# Patient Record
Sex: MALE | Race: WHITE | Employment: OTHER | URBAN - METROPOLITAN AREA
[De-identification: names, ages, dates, MRNs, and addresses within clinical notes are randomized per-mention and may not be internally consistent; named-entity substitution may affect disease eponyms.]

---

## 2017-10-24 PROBLEM — N52.9 ED (ERECTILE DYSFUNCTION) OF ORGANIC ORIGIN: Status: ACTIVE | Noted: 2017-10-24

## 2017-10-24 PROBLEM — Z80.42 FH: PROSTATE CANCER: Status: ACTIVE | Noted: 2017-10-24

## 2018-01-03 ENCOUNTER — HOSPITAL ENCOUNTER (OUTPATIENT)
Dept: PHYSICAL THERAPY | Age: 58
Discharge: HOME | End: 2018-01-03
Payer: COMMERCIAL

## 2018-01-03 PROCEDURE — 97110 THERAPEUTIC EXERCISES: CPT

## 2018-01-03 PROCEDURE — 97161 PT EVAL LOW COMPLEX 20 MIN: CPT

## 2018-01-03 PROCEDURE — 97112 NEUROMUSCULAR REEDUCATION: CPT

## 2018-01-03 PROCEDURE — 97140 MANUAL THERAPY 1/> REGIONS: CPT

## 2018-01-03 NOTE — PROGRESS NOTES
PT DAILY TREATMENT NOTE/LUMBAR EVAL 3-16    Patient Name: Johnny Barber MD  Date:1/3/2018  : 1960  [x]  Patient  Verified  Payor: Reyes Singer / [facility] / Product Type: Commerical /    In time:5:13  Out time:6:30  Total Treatment Time (min): 73  Visit #: 1 of 10    Treatment Area: Low back pain [M54.5]  SUBJECTIVE  Pain Level (0-10 scale): 3  []constant []intermittent []improving []worsening []no change since onset    Any medication changes, allergies to medications, adverse drug reactions, diagnosis change, or new procedure performed?: [x] No    [] Yes (see summary sheet for update)  Subjective functional status/changes:     Subjective:  Pt c/o long term back pain since the [employer] days. Numbness in (R) foot about 3 months ago. .  Recdived MRI showing multilevel DDD with bulge at L5 - S1. Considered inversion table but has not tried it yet. 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 Standing a lot with work, [employer] workout 1x/week    Limitations to Activity/PLOF:       Current Health Status:  Good    Barriers: [x]pain []financial []time []transportation []other    Goal: Have the numbness in the foot go away.   Not have the back pain , Increase function with less discomfort    FOTO: 65 / 71 10v    Motivation: High    Substance use: []Alcohol []Tobacco []other:     FABQ Score: []low []elevate    Cognition: A & O x 3    Other:    OBJECTIVE/EXAMINATION  - Gait - Decr (B) Hip Flx, Decr (L) pelvic sway  - Post Rot (R) Innominate  - Elev (R) ASIS  - Decr mobilty (R) Innominate in stork stance  - Fingertip-Floor 18cm  - HS 90/90 (R) 150 (L) 150  - MMT (B) Hip Flx 4-4+  - Decr (R) Multifidus initiation  - Elev (L) sacral base           35 min [x]Eval                  []Re-Eval       10 min Therapeutic Exercise:  [x] See flow sheet : Develop and instruct HEP   Rationale: increase ROM, increase strength and improve coordination to improve the patients ability to perform increased ADL    18 min Neuromuscular Re-education:  [x]  See flow sheet : Positional Distraction Progressive elevation into Lx Ext and progressive lowering   Rationale: increase ROM, increase strength and improve coordination  to improve the patients ability to perform increased ADL    10 min Manual Therapy:  Inf glide (L) sacral base, (B) Lx rot mobs, (B) Innominate p/a mobs, (B) LE LAD, TPR (R) Quadratus Lumbrum   Rationale: decrease pain, increase ROM, increase tissue extensibility and decrease trigger points to tolerate increased activity levels       With   [x] TE   [] TA   [] neuro   [] other: Patient Education: [x] Review HEP    [] Progressed/Changed HEP based on:   [] positioning   [] body mechanics   [] transfers   [] heat/ice application    [] other:      Other Objective/Functional Measures:   - Good pelvic alignment after treatment and improve gait   - Improved (B) pelvic sway after treatment-     Pain Level (0-10 scale) post treatment: 3    ASSESSMENT/Changes in Function:      Patient will continue to benefit from skilled PT services to modify and progress therapeutic interventions, address functional mobility deficits, address ROM deficits, address strength deficits, analyze and address soft tissue restrictions and analyze and cue movement patterns to attain remaining goals. [x]  See Plan of Care  []  See progress note/recertification  []  See Discharge Summary         Progress towards goals / Updated goals:  Short Term Goals:  To be accomplished in 5 treatments:  1.  Pt will be compliant and independent with HEP in order to facilitate PT sessions and aid with self management                        Eval Status:  Initiated                        Current Status:  2.  Pt to tolerate 30 min or more of TE and/or Interventions w/o increased s/s                        Eval Status:  Initiated                        Current Status:     Long Term Goals:  To be accomplished in 10 treatments:  Progress towards goals / Updated goals:  1.  Pt will report 50% improvement or better with involvement to show a significant increase in function                        Eval Status:  Initiated                        Current Status:  2.  Pt will Demonstrate good pelvic alignment and mobility to aid with decreased pain of the Lx spine                        Eval Status:  Initiated                        Current Status:  3.  Pt will have improved (B) HS length to 160* or better to allow good pelvic mobility and decreased pain to 1/10 or better to tolerate leaning over a sink to brush teeth w/o added pain                          Eval Status:  Initiated                        Current Status:  4.  Pt will have good (R) multifidus initiation to aid in Lx stability with prolonged standing and leaning forward to brush teeth and perform usual work                          Eval Status:  Initiated                        Current Status:  5.  Pt will improve FOTO score to 71 in 10 visits to show significant improvement in function for progress to little to no numbness/Neural tension of the (R) foot                        Eval Status: 65                        Current Status:    PLAN  [x]  Upgrade activities as tolerated     [x]  Continue plan of care  []  Update interventions per flow sheet       []  Discharge due to:_  []  Other:_      Andrew So, PT 1/3/2018  5:17 PM

## 2018-01-04 NOTE — PROGRESS NOTES
[facility] [address], [address] [place] [address], [address] [address]  [phone] fax      Plan of Care/ Statement of Necessity for Physical Therapy Services    Patient name: Ascencion Ansari MD Start of Care: 1/3/2018   Referral source: Lacy Gonzalez MD : 1960    Medical Diagnosis: Low back pain [M54.5]   Onset Date:3 Months ago    Treatment Diagnosis: Pelvic obliquity, Neural tension (R) LE   Prior Hospitalization: see medical history Provider#: [id number]   Medications: Verified on Patient summary List    Comorbidities: None noted   Prior Level of Function: (I) with normal activity with some back pain     The Plan of Care and following information is based on the information from the initial evaluation. Assessment/ key information: Patient is a 59 y.o. male referred to PT with the above Dx. Patient seen today for c/o  long term back pain since the [employer] days with recent onset of numbness in (R) foot about 3 months ago. Received an MRI showing multilevel DDD with bulge at L5 - S1. Considered inversion table but has not tried it yet. Pain with leaning forward a little bit as when brushing his teeth. Patient presents to PT with an impaired gait, decreased strength, decreased flexibility, and decreased mobility. He has a pelvic obliquity, limited (B) HS length, Decreased initiation (R) multifidus, and minor neural tension noted in the (R) LE. Patient s/s appear to be consistent w/ diagnosis. Patient demonstrates the potential to make functional gains within a reasonable time frame. Patient will benefit from skilled PT to address impairments and improve functional mobility, strength, gait and balance for an improved quality of life.   Fall Risk Assessment: Patient demonstrates no Fall Risk   Evaluation Complexity History MEDIUM  Complexity : 1-2 comorbidities / personal factors will impact the outcome/ POC ; Examination MEDIUM Complexity : 3 Standardized tests and measures addressing body structure, function, activity limitation and / or participation in recreation  ;Presentation LOW Complexity : Stable, uncomplicated  ;Clinical Decision Making MEDIUM Complexity : FOTO score of 26-74  Overall Complexity Rating: LOW   Problem List: pain affecting function, decrease ROM, decrease strength, impaired gait/ balance, decrease ADL/ functional abilitiies, decrease activity tolerance, decrease flexibility/ joint mobility, decrease transfer abilities and other FOTO = 65   Treatment Plan may include any combination of the following: Therapeutic exercise, Therapeutic activities, Neuromuscular re-education, Physical agent/modality, Gait/balance training, Manual therapy, Patient education, Self Care training and Functional mobility training  Patient / Family readiness to learn indicated by: asking questions, trying to perform skills and interest  Persons(s) to be included in education: patient (P)  Barriers to Learning/Limitations: None  Patient Goal (s): Have the numbness in the foot go away. Not have the back pain , Increase function with less discomfort  Patient Self Reported Health Status: good  Rehabilitation Potential: good    Short Term Goals: To be accomplished in 5 treatments:  1. Pt will be compliant and independent with HEP in order to facilitate PT sessions and aid with self management                        Eval Status:  Initiated                        Current Status:  2. Pt to tolerate 30 min or more of TE and/or Interventions w/o increased s/s                        Eval Status:  Initiated                        Current Status:    Long Term Goals: To be accomplished in 10 treatments:  Progress towards goals / Updated goals:  1. Pt will report 50% improvement or better with involvement to show a significant increase in function                        Eval Status:  Initiated                        Current Status:  2.   Pt will Demonstrate good pelvic alignment and mobility to aid with decreased pain of the Lx spine                        Eval Status:  Initiated                        Current Status:  3. Pt will have improved (B) HS length to 160* or better to allow good pelvic mobility and decreased pain to 1/10 or better to tolerate leaning over a sink to brush teeth w/o added pain                          Eval Status:  Initiated                        Current Status:  4. Pt will have good (R) multifidus initiation to aid in Lx stability with prolonged standing and leaning forward to brush teeth and perform usual work                          Eval Status:  Initiated                        Current Status:  5. Pt will improve FOTO score to 71 in 10 visits to show significant improvement in function for progress to little to no numbness/Neural tension of the (R) foot                        Eval Status: 65                        Current Status:  Frequency / Duration: Patient to be seen 2-3 times per week for 10 treatments. Patient/ Caregiver education and instruction: Diagnosis, prognosis, self care, activity modification and exercises   [x]  Plan of care has been reviewed with GAYLA Mercedes, PT 1/3/2018 7:04 PM  ________________________________________________________________________    I certify that the above Therapy Services are being furnished while the patient is under my care. I agree with the treatment plan and certify that this therapy is necessary.     [de-identified] Signature:____________________  Date:____________Time: _________    Please sign and return to [facility] [address] [place] [address], [address] [place] [address], [address] [place] [address] [address] [address]  [phone] fax

## 2018-01-18 ENCOUNTER — HOSPITAL ENCOUNTER (OUTPATIENT)
Dept: PHYSICAL THERAPY | Age: 58
Discharge: HOME | End: 2018-01-18
Payer: COMMERCIAL

## 2018-01-18 PROCEDURE — 97110 THERAPEUTIC EXERCISES: CPT

## 2018-01-18 PROCEDURE — 97140 MANUAL THERAPY 1/> REGIONS: CPT

## 2018-01-18 PROCEDURE — 97112 NEUROMUSCULAR REEDUCATION: CPT

## 2018-01-19 NOTE — PROGRESS NOTES
PT DAILY TREATMENT NOTE 3-16    Patient Name: Melissa Barry MD  Date:2018  : 1960  [x]  Patient  Verified  Payor: Felisha Krueger / [facility] / Product Type: Commerical /    In time:5:37  Out time:6:55  Total Treatment Time (min): 62  Visit #: 2 of 10    Treatment Area: Low back pain [M54.5]    SUBJECTIVE  Pain Level (0-10 scale): 3  Any medication changes, allergies to medications, adverse drug reactions, diagnosis change, or new procedure performed?: [x] No    [] Yes (see summary sheet for update)  Subjective functional status/changes:   [] No changes reported  Tingling in foot is less    OBJECTIVE  25 min Therapeutic Exercise:  [x] See flow sheet :   Rationale: increase ROM, increase strength and improve coordination to improve the patients ability to tolerate increased activity levels  20 min Neuromuscular Re-education:  [x]  See flow sheet : Posn Distraction Lx Ext  In prone progressing to Max at ~ 35* Ext at 3  x5 min intervals w/MHP   Rationale: increase ROM, increase strength and improve coordination  to improve the patients ability to tolerate increased activity levels  12 min Manual Therapy:  STM/DTM (R) Quad Lumborum, Lx Rot mobs, (B) Innominate p/a mobs   Rationale: decrease pain, increase ROM, increase tissue extensibility and decrease trigger points to perform increased ADL       With   [x] TE   [] TA   [] neuro   [] other: Patient Education: [x] Review HEP    [] Progressed/Changed HEP based on:   [] positioning   [] body mechanics   [] transfers   [] heat/ice application    [] other:      Other Objective/Functional Measures:   Good tolerance with first full treatment     Pain Level (0-10 scale) post treatment: 2    ASSESSMENT/Changes in Function:      Patient will continue to benefit from skilled PT services to modify and progress therapeutic interventions, address functional mobility deficits, address ROM deficits, address strength deficits, analyze and address soft tissue restrictions and analyze and cue movement patterns to attain remaining goals. []  See Plan of Care  []  See progress note/recertification  []  See Discharge Summary         Progress towards goals / Updated goals:  Short Term Goals: To be accomplished in 5 treatments:  1.  Pt will be compliant and independent with HEP in order to facilitate PT sessions and aid with self management                        Eval Status:  Initiated                        Current Status:  2.  Pt to tolerate 30 min or more of TE and/or Interventions w/o increased s/s                        Eval Status:  Initiated                        IATAWKF Status: Met 1/18/18     Long Term Goals:  To be accomplished in 10 treatments:  Progress towards goals / Updated goals:  1.  Pt will report 50% improvement or better with involvement to show a significant increase in function                        Eval Status:  Initiated                        Current Status:  2.  Pt will Demonstrate good pelvic alignment and mobility to aid with decreased pain of the Lx spine                        Eval Status:  Initiated                        Current Status:  3.  Pt will have improved (B) HS length to 160* or better to allow good pelvic mobility and decreased pain to 1/10 or better to tolerate leaning over a sink to brush teeth w/o added pain                          Eval Status:  Initiated                        Current Status:  4.  Pt will have good (R) multifidus initiation to aid in Lx stability with prolonged standing and leaning forward to brush teeth and perform usual work                          Eval Status:  Initiated                        Current Status:  5.  Pt will improve FOTO score to 71 in 10 visits to show significant improvement in function for progress to little to no numbness/Neural tension of the (R) foot                        Eval Status: 65                        Current Status:    PLAN  [x]  Upgrade activities as tolerated [x]  Continue plan of care  []  Update interventions per flow sheet       []  Discharge due to:_  []  Other:_      Shaina Nixon, PT 1/18/2018  7:14 PM    Future Appointments  Date Time Provider Cielo aBiley   10/9/2018 8:30 AM [facility] [id number]   10/16/2018 3:30 PM Dusty Landrum MD [id number]

## 2018-02-09 ENCOUNTER — HOSPITAL ENCOUNTER (OUTPATIENT)
Dept: PHYSICAL THERAPY | Age: 58
Discharge: HOME | End: 2018-02-09
Payer: COMMERCIAL

## 2018-02-09 PROCEDURE — 97140 MANUAL THERAPY 1/> REGIONS: CPT

## 2018-02-09 PROCEDURE — 97035 APP MDLTY 1+ULTRASOUND EA 15: CPT

## 2018-02-09 PROCEDURE — 97110 THERAPEUTIC EXERCISES: CPT

## 2018-02-09 NOTE — PROGRESS NOTES
PT DAILY TREATMENT NOTE 3-16    Patient Name: Carie Lopez MD  Date:2018  : 1960  [x]  Patient  Verified  Payor: Katrin Manzo / Plan: [facility] / Product Type: Commerical /    In time:1110  Out time:1205  Total Treatment Time (min): 48  Visit #: 3 of 10    Treatment Area: Low back pain [M54.5]    SUBJECTIVE  Pain Level (0-10 scale): 4  Any medication changes, allergies to medications, adverse drug reactions, diagnosis change, or new procedure performed?: [x] No    [] Yes (see summary sheet for update)  Subjective functional status/changes:   [] No changes reported  Still getting a little numbness in the (R) foot but mainly feel it when walking an my bare bathroom floor    OBJECTIVE  Modality rationale: decrease inflammation, decrease pain and increase tissue extensibility to improve the patients ability to perform increased ADL   Min Type Additional Details    [] Estim:  []Unatt       []IFC  []Premod                        []Other:  []w/ice   []w/heat  Position:  Location:    [] Estim: []Att    []TENS instruct  []NMES                    []Other:  []w/US   []w/ice   []w/heat  Position:  Location:    []  Traction: [] Cervical       []Lumbar                       [] Prone          []Supine                       []Intermittent   []Continuous Lbs:  [] before manual  [] after manual    [x]  Ultrasound: [x]Continuous   [] Pulsed     8' min                      [x]1MHz   []3MHz Location: Prone  W/cm2:1.5    []  Iontophoresis with dexamethasone         Location: [] Take home patch   [] In clinic    []  Ice     []  heat  []  Ice massage  []  Laser   []  Anodyne Position:  Location:    []  Laser with stim  []  Other: Position:  Location:    []  Vasopneumatic Device Pressure:       [] lo [] med [] hi   Temperature: [] lo [] med [] hi   [x] Skin assessment post-treatment:  [x]intact []redness- no adverse reaction    []redness  adverse reaction:       35 min Therapeutic Exercise:  [x] See flow sheet : Rationale: increase ROM, increase strength and improve coordination to improve the patients ability to tolerate increased activity levels  10 min Manual Therapy:  STM/DTM (R) Quad Lumborum, Lx Rot mobs, (B) Innominate p/a mobs   Rationale: decrease pain, increase ROM, increase tissue extensibility and decrease trigger points to perform increased ADL         With   [] TE   [] TA   [] neuro   [] other: Patient Education: [x] Review HEP    [] Progressed/Changed HEP based on:   [] positioning   [] body mechanics   [] transfers   [] heat/ice application    [] other:      Other Objective/Functional Measures:   - Add JAI for 1', repeated Hip Flx, High Knees  - Good tolerance with added exercises  - TTP (R) Quadratus Lumborum  - Pt reports feeling looser after treatment  - Pt amb 190' with good tolerance       Pain Level (0-10 scale) post treatment: 2    ASSESSMENT/Changes in Function:      Patient will continue to benefit from skilled PT services to modify and progress therapeutic interventions, address functional mobility deficits, address ROM deficits, address strength deficits, analyze and address soft tissue restrictions, analyze and cue movement patterns and analyze and modify body mechanics/ergonomics to attain remaining goals. []  See Plan of Care  []  See progress note/recertification  []  See Discharge Summary         Progress towards goals / Updated goals:  Short Term Goals: To be accomplished in 5 treatments:  1.  Pt will be compliant and independent with HEP in order to facilitate PT sessions and aid with self management                        Eval Status:  Initiated                        Current Status:  2.  Pt to tolerate 30 min or more of TE and/or Interventions w/o increased s/s                        Eval Status:  Initiated                        Current Status: Met at 35 min 2/9/18     Long Term Goals:  To be accomplished in 10 treatments:  Progress towards goals / Updated goals:  1.  Pt will report 50% improvement or better with involvement to show a significant increase in function                        Eval Status:  Initiated                        Current Status:  2.  Pt will Demonstrate good pelvic alignment and mobility to aid with decreased pain of the Lx spine                        Eval Status:  Initiated                        Current Status:  3.  Pt will have improved (B) HS length to 160* or better to allow good pelvic mobility and decreased pain to 1/10 or better to tolerate leaning over a sink to brush teeth w/o added pain                          Eval Status: 150 (B)                        Current Status:  4.  Pt will have good (R) multifidus initiation to aid in Lx stability with prolonged standing and leaning forward to brush teeth and perform usual work                          Eval Status:  Initiated                        Current Status: Progressing with VCs 2/9/18  5.  Pt will improve FOTO score to 71 in 10 visits to show significant improvement in function for progress to little to no numbness/Neural tension of the (R) foot                        Eval Status: 65                        Current Status:    PLAN  [x]  Upgrade activities as tolerated     [x]  Continue plan of care  []  Update interventions per flow sheet       []  Discharge due to:_  []  Other:_      Shima Plascencia PT 2/9/2018  11:35 AM    Future Appointments  Date Time Provider Cielo Bailey   2/12/2018 4:30 PM Shima Plascencia PT [facility]   2/16/2018 5:00 PM Shima Plascencia PT [facility]   2/26/2018 4:30 PM Shima Plascencia PT [facility]   2/28/2018 4:30 PM Shima Plascencia PT [facility]   10/9/2018 8:30 AM [facility] [address] [place] [address]   10/16/2018 3:30 PM Garrison Huang MD [id number] [facility]

## 2018-02-12 ENCOUNTER — HOSPITAL ENCOUNTER (OUTPATIENT)
Dept: PHYSICAL THERAPY | Age: 58
Discharge: HOME | End: 2018-02-12
Payer: COMMERCIAL

## 2018-02-12 PROCEDURE — 97140 MANUAL THERAPY 1/> REGIONS: CPT

## 2018-02-12 PROCEDURE — 97110 THERAPEUTIC EXERCISES: CPT

## 2018-02-12 PROCEDURE — 97035 APP MDLTY 1+ULTRASOUND EA 15: CPT

## 2018-02-12 NOTE — PROGRESS NOTES
PT DAILY TREATMENT NOTE 3-16    Patient Name: Berta Bear MD  Date:2018  : 1960  [x]  Patient  Verified  Payor: Dax Coates / Plan: [place] [employer]  CAPITATED PT / Product Type: Commerical /    In time:1110  Out time:1205  Total Treatment Time (min): 48  Visit #: 4 of 10    Treatment Area: Low back pain [M54.5]    SUBJECTIVE  Pain Level (0-10 scale): 3  Any medication changes, allergies to medications, adverse drug reactions, diagnosis change, or new procedure performed?: [x] No    [] Yes (see summary sheet for update)  Subjective functional status/changes:   [] No changes reported  Still getting a little numbness in the (R) foot but mainly feel it when walking an my bare bathroom floor    OBJECTIVE  Modality rationale: decrease inflammation, decrease pain and increase tissue extensibility to improve the patients ability to perform increased ADL   Min Type Additional Details    [] Estim:  []Unatt       []IFC  []Premod                        []Other:  []w/ice   []w/heat  Position:  Location:    [] Estim: []Att    []TENS instruct  []NMES                    []Other:  []w/US   []w/ice   []w/heat  Position:  Location:    []  Traction: [] Cervical       []Lumbar                       [] Prone          []Supine                       []Intermittent   []Continuous Lbs:  [] before manual  [] after manual    [x]  Ultrasound: [x]Continuous   [] Pulsed     8' min                      [x]1MHz   []3MHz Location: (R) Quadratus Lumborum  W/cm2:1.5    []  Iontophoresis with dexamethasone         Location: [] Take home patch   [] In clinic    []  Ice     []  heat  []  Ice massage  []  Laser   []  Anodyne Position:  Location:    []  Laser with stim  []  Other: Position:  Location:    []  Vasopneumatic Device Pressure:       [] lo [] med [] hi   Temperature: [] lo [] med [] hi   [x] Skin assessment post-treatment:  [x]intact []redness- no adverse reaction    []redness  adverse reaction:       34 min Therapeutic Exercise:  [x] See flow sheet :   Rationale: increase ROM, increase strength and improve coordination to improve the patients ability to tolerate increased activity levels  8 min Manual Therapy:  STM/DTM (R) Quad Lumborum, Lx Rot mobs, (B) Innominate p/a mobs   Rationale: decrease pain, increase ROM, increase tissue extensibility and decrease trigger points to perform increased ADL         With   [x] TE   [] TA   [] neuro   [] other: Patient Education: [x] Review HEP    [] Progressed/Changed HEP based on:   [] positioning   [] body mechanics   [] transfers   [] heat/ice application    [] other:      Other Objective/Functional Measures:   - Good progress with innominate mobility  - Minor (R) Innominate post rotation       Pain Level (0-10 scale) post treatment: 2    ASSESSMENT/Changes in Function:      Patient will continue to benefit from skilled PT services to modify and progress therapeutic interventions, address functional mobility deficits, address ROM deficits, address strength deficits, analyze and address soft tissue restrictions, analyze and cue movement patterns and analyze and modify body mechanics/ergonomics to attain remaining goals. []  See Plan of Care  []  See progress note/recertification  []  See Discharge Summary         Progress towards goals / Updated goals:  Short Term Goals: To be accomplished in 5 treatments:  1.  Pt will be compliant and independent with HEP in order to facilitate PT sessions and aid with self management                        Eval Status:  Initiated                        Current Status:  2.  Pt to tolerate 30 min or more of TE and/or Interventions w/o increased s/s                        Eval Status:  Initiated                        Current Status: Met at 35 min 2/9/18     Long Term Goals:  To be accomplished in 10 treatments:  Progress towards goals / Updated goals:  1.  Pt will report 50% improvement or better with involvement to show a significant increase in function                        Eval Status:  Initiated                        Current Status:  2.  Pt will Demonstrate good pelvic alignment and mobility to aid with decreased pain of the Lx spine                        Eval Status:  Initiated                        QXARZDT Status:  3.  Pt will have improved (B) HS length to 160* or better to allow good pelvic mobility and decreased pain to 1/10 or better to tolerate leaning over a sink to brush teeth w/o added pain                          Eval Status: 150 (B)                        Current Status:  4.  Pt will have good (R) multifidus initiation to aid in Lx stability with prolonged standing and leaning forward to brush teeth and perform usual work                          Eval Status:  Initiated                        Current Status: Improved initiation 2/12/18  5.  Pt will improve FOTO score to 71 in 10 visits to show significant improvement in function for progress to little to no numbness/Neural tension of the (R) foot                        Eval Status: 65                        Current Status:    PLAN  [x]  Upgrade activities as tolerated     [x]  Continue plan of care  []  Update interventions per flow sheet       []  Discharge due to:_  []  Other:_      Shaina Nixon PT 2/12/2018  16:15 PM    Future Appointments  Date Time Provider Cielo Bailey   2/16/2018 5:00 PM Shaina Nixon PT [facility]   2/26/2018 4:30 PM Shaina Nixon PT [facility]   2/28/2018 4:30 PM Shaina Nixon PT [facility]   3/26/2018 4:30 PM Shaina Nixon PT [facility]   3/28/2018 4:30 PM Shaina Nixon PT [facility]   10/9/2018 8:30 AM [facility] NURSE [place] [id number]   10/16/2018 3:30 PM Dusty Landrum MD [address]

## 2018-02-16 ENCOUNTER — HOSPITAL ENCOUNTER (OUTPATIENT)
Dept: PHYSICAL THERAPY | Age: 58
Discharge: HOME | End: 2018-02-16
Payer: COMMERCIAL

## 2018-02-16 PROCEDURE — 97140 MANUAL THERAPY 1/> REGIONS: CPT

## 2018-02-16 PROCEDURE — 97110 THERAPEUTIC EXERCISES: CPT

## 2018-02-16 NOTE — PROGRESS NOTES
PT DAILY TREATMENT NOTE 3-16    Patient Name: Carie Lopez MD  Date:2018  : 1960  [x]  Patient  Verified  Payor: Katrin Manzo / Plan: [place] [employer]  CAPITATRAN PT / Product Type: Commerical /    In time:5:14  Out time:6:13  Total Treatment Time (min): 59  Visit #: 5 of 10    Treatment Area: Low back pain [M54.5]    SUBJECTIVE  Pain Level (0-10 scale): 3  Any medication changes, allergies to medications, adverse drug reactions, diagnosis change, or new procedure performed?: [x] No    [] Yes (see summary sheet for update)  Subjective functional status/changes:   [] No changes reported  Still getting a little numbness     OBJECTIVE      43 min Therapeutic Exercise:  [x] See flow sheet :   Rationale: increase ROM, increase strength and improve coordination to improve the patients ability to tolerate increased activity levels  12 min Manual Therapy:  STM/DTM (R) Quad Lumborum,   Rationale: decrease pain, increase ROM, increase tissue extensibility and decrease trigger points to perform increased ADL  4 min Neuromuscular Re-education:  [x]  See flow sheet : KT I-Strip for spacing at the (R) Quadratus lumborum full strength    Rationale: increase ROM, increase strength and improve coordination  to improve the patients ability to perform increased ADL          With   [x] TE   [] TA   [] neuro   [] other: Patient Education: [x] Review HEP    [] Progressed/Changed HEP based on:   [] positioning   [] body mechanics   [] transfers   [] heat/ice application    [] other:      Other Objective/Functional Measures:   - Good progress with innominate mobility  - Add Quadratus Lumborum str  - Increase STM/DTM (R) Quadratus Lumborum  - - Treat with KT Tape       Pain Level (0-10 scale) post treatment: 0    ASSESSMENT/Changes in Function:      Patient will continue to benefit from skilled PT services to modify and progress therapeutic interventions, address functional mobility deficits, address ROM deficits, address strength deficits, analyze and address soft tissue restrictions, analyze and cue movement patterns and analyze and modify body mechanics/ergonomics to attain remaining goals. []  See Plan of Care  []  See progress note/recertification  []  See Discharge Summary         Progress towards goals / Updated goals:  Short Term Goals: To be accomplished in 5 treatments:  1.  Pt will be compliant and independent with HEP in order to facilitate PT sessions and aid with self management                        Eval Status:  Initiated                        Current Status:  2.  Pt to tolerate 30 min or more of TE and/or Interventions w/o increased s/s                        Eval Status:  Initiated                        Current Status: Met at 35 min 2/9/18     Long Term Goals:  To be accomplished in 10 treatments:  Progress towards goals / Updated goals:  1.  Pt will report 50% improvement or better with involvement to show a significant increase in function                        Eval Status:  Initiated                        Current Status:  2.  Pt will Demonstrate good pelvic alignment and mobility to aid with decreased pain of the Lx spine                        Eval Status:  Initiated                        Current Status: Progressing with good (B) Innominate mobility today 2/16/18  3.  Pt will have improved (B) HS length to 160* or better to allow good pelvic mobility and decreased pain to 1/10 or better to tolerate leaning over a sink to brush teeth w/o added pain                          Eval Status: 150 (B)                        Current Status:  4.  Pt will have good (R) multifidus initiation to aid in Lx stability with prolonged standing and leaning forward to brush teeth and perform usual work                          Eval Status:  Initiated                        Current Status: Pt showing good initiation (B) Multifidus 2/16/18  5.  Pt will improve FOTO score to 71 in 10 visits to show significant improvement in function for progress to little to no numbness/Neural tension of the (R) foot                        Eval Status: 65                        Current Status:    PLAN  [x]  Upgrade activities as tolerated     [x]  Continue plan of care  []  Update interventions per flow sheet       []  Discharge due to:_  []  Other:_      Roshan Shafer, PT 2/16/2018  5:40 PM    Future Appointments  Date Time Provider Cielo Bailey   2/26/2018 4:30 PM Roshan Shafer, PT [facility]   2/28/2018 4:30 PM Roshan Shafer, PT [facility]   3/26/2018 4:30 PM Roshan Shafer, PT [facility]   3/28/2018 4:30 PM Roshan Shafer, PT [facility]   10/9/2018 8:30 AM [address]   10/16/2018 3:30 PM Elis Amanda MD [id number]

## 2018-02-26 ENCOUNTER — HOSPITAL ENCOUNTER (OUTPATIENT)
Dept: PHYSICAL THERAPY | Age: 58
Discharge: HOME | End: 2018-02-26
Payer: COMMERCIAL

## 2018-02-26 PROCEDURE — 97110 THERAPEUTIC EXERCISES: CPT

## 2018-02-26 PROCEDURE — 97112 NEUROMUSCULAR REEDUCATION: CPT

## 2018-02-26 PROCEDURE — 97140 MANUAL THERAPY 1/> REGIONS: CPT

## 2018-02-26 PROCEDURE — 97032 APPL MODALITY 1+ESTIM EA 15: CPT

## 2018-02-26 NOTE — PROGRESS NOTES
PT DAILY TREATMENT NOTE 3-16    Patient Name: Carie Lopez MD  Date:2018  : 1960  [x]  Patient  Verified  Payor: Katrin Jovany / Plan: [place] [employer]  CAPITATED PT / Product Type: Commerical /    In time:4:39  Out time:5:44  Total Treatment Time (min): 54  Visit #: 6 of 10    Treatment Area: Low back pain [M54.5]    SUBJECTIVE  Pain Level (0-10 scale): 2 (R) side 6 (L) side  Any medication changes, allergies to medications, adverse drug reactions, diagnosis change, or new procedure performed?: [x] No    [] Yes (see summary sheet for update)  Subjective functional status/changes:   [] No changes reported  Tape seemed to help. Abut two hours ago, was bending forward to observe a patient and felt a sharp pain at the (L) Lx paraspinals. Took 440mg Naproxen. Pt reports 50% improvement since start of treatment. Decreased numbness (R) foot.       OBJECTIVE  Modality rationale: decrease inflammation, decrease pain and increase tissue extensibility to improve the patients ability to perform increased ADL   Min Type Additional Details    [] Estim:  []Unatt       []IFC  []Premod                        []Other:  []w/ice   []w/heat  Position:  Location:   8 [x] Estim: [x]Att    []TENS instruct  []NMES                    [x]Other:  []w/US   []w/ice   []w/heat  Position: Prone  Location: (B) Lx/Tx paraspinals    []  Traction: [] Cervical       []Lumbar                       [] Prone          []Supine                       []Intermittent   []Continuous Lbs:  [] before manual  [] after manual    []  Ultrasound: []Continuous   [] Pulsed                           []1MHz   []3MHz Location:  W/cm2:    []  Iontophoresis with dexamethasone         Location: [] Take home patch   [] In clinic    []  Ice     []  heat  []  Ice massage  []  Laser   []  Anodyne Position:  Location:    []  Laser with stim  []  Other: Position:  Location:    []  Vasopneumatic Device Pressure:       [] lo [] med [] hi   Temperature: [] lo [] med [] hi [x] Skin assessment post-treatment:  [x]intact []redness- no adverse reaction    []redness  adverse reaction:        10 min Therapeutic Exercise:  [x] See flow sheet : Assess mobility (L) Lx region   Rationale: increase ROM, increase strength and improve coordination to improve the patients ability to perform increased ADL  28 min Manual Therapy:  STM/TPR (B) Lx Paraspinals, Lx/Tx Rot mobs, Lx/Tx (L) SB mobs, (B) LE LAD   Rationale: decrease pain, increase ROM, increase tissue extensibility and decrease trigger points to perform increased ADL  8 min Neuromuscular Re-education:  []  See flow sheet : KT I-Strip for spacing at (B) Lx paraspinals    Rationale: increase ROM, increase strength and improve coordination  to improve the patients ability to perform increased ADL          With   [x] TE   [] TA   [x] neuro   [] other: Patient Education: [x] Review HEP    [] Progressed/Changed HEP based on:   [] positioning   [] body mechanics   [] transfers   [] heat/ice application    [] other:      Other Objective/Functional Measures:   - TTP (L) Lx paraspinal at T11-L2  - Decr ability for (L) SB with possible facet blockage  - Improved mobility (L) trunk SB after treatment     Pain Level (0-10 scale) post treatment: 2-3 (L) 1 (R)    ASSESSMENT/Changes in Function:  Patient has shown good progress with this treatment program. Pain as of last visit was 2/10 at primary treatment site bu pt c/o (L) Lx paraspinal pain after bending to observe his patient this evening. Patient has shown decreased pain and increased mobility. Patient reports 50% improvement with overall involvement. Minor decline in FOTO score to 63 from 65 at initial assessment. All STG/LTGs achieved as identified below.       Fall Risk Assessment: Patient demonstrates no Fall Risk     Patient will continue to benefit from skilled PT services to modify and progress therapeutic interventions, address functional mobility deficits, address ROM deficits, address strength deficits, analyze and address soft tissue restrictions and analyze and cue movement patterns to attain remaining goals.      []  See Plan of Care  [x]  See progress note/recertification  []  See Discharge Summary         Progress towards goals / Updated goals:  Short Term Goals: To be accomplished in 5 treatments:  1.  Pt will be compliant and independent with HEP in order to facilitate PT sessions and aid with self management                        Eval Status:  Initiated                        Current Status:  2.  Pt to tolerate 30 min or more of TE and/or Interventions w/o increased s/s                        Eval Status:  Initiated                        Current Status: Met at 35 min 2/9/18      Long Term Goals: To be accomplished in 10 treatments:  Progress towards goals / Updated goals:  1.  Pt will report 50% improvement or better with involvement to show a significant increase in function                        Eval Status:  Initiated                        Current Status: Met at 50% 2/26/18  2.  Pt will Demonstrate good pelvic alignment and mobility to aid with decreased pain of the Lx spine                        Eval Status:  Initiated                        Current Status: Progressing with good (B) Innominate mobility today 2/16/18  3.  Pt will have improved (B) HS length to 160* or better to allow good pelvic mobility and decreased pain to 1/10 or better to tolerate leaning over a sink to brush teeth w/o added pain                          Eval Status: 150 (B)                        Current Status: NT  4.  Pt will have good (R) multifidus initiation to aid in Lx stability with prolonged standing and leaning forward to brush teeth and perform usual work                          Eval Status:  Initiated                        Current Status: Pt showing good initiation (B) Multifidus 2/16/18  5.  Pt will improve FOTO score to 71 in 10 visits to show significant improvement in function for progress to little to no numbness/Neural tension of the (R) foot                        Eval Status: 65                        Current Status: Declined to 63 2/26/18  PLAN  [x]  Upgrade activities as tolerated     [x]  Continue plan of care  []  Update interventions per flow sheet       []  Discharge due to:_  []  Other:_      Karie Cardoza, PT 2/26/2018  4:47 PM    Future Appointments  Date Time Provider Cielo Bailey   2/28/2018 4:30 PM Karie Cardoza, PT [facility]   3/26/2018 4:30 PM Karie Cardoza, PT [facility]   3/28/2018 4:30 PM Karie Cardoza, PT [facility]   10/9/2018 8:30 AM [facility]   10/16/2018 3:30 PM Kelly Quintero MD [address]

## 2018-02-26 NOTE — PROGRESS NOTES
Aden [address], [address] [place] [address], [address] [place], [place] [address] [address]  [phone] fax    Progress Note  Patient name: Isamar Dunn MD Start of Care: 1/3/2018   Referral source: Kenya Landa MD : 1960                          Medical Diagnosis: Low back pain [M54.5] Onset Date:3 Months ago                          Treatment Diagnosis: Pelvic obliquity, Neural tension (R) LE   Prior Hospitalization: see medical history Provider#: [id number]   Medications: Verified on Patient summary List    Comorbidities: None noted   Prior Level of Function: (I) with normal activity with some back pain  Visits from Start of Care: 6    Missed Visits: 0    Established Goals:         Excellent           Good         Limited           None  [] Increased ROM   []  []  []  []  [] Increased Strength  []  [x]  []  []  [] Increased Mobility  []  [x]  []  []   [] Decreased Pain   []  [x]  [x]  []  [] Decreased Swelling  []  []  []  []    Key Functional Changes: Patient has shown good progress with this treatment program. Pain as of last visit was 2/10 at primary treatment site bu pt c/o (L) Lx paraspinal pain after bending to observe his patient this evening. Patient has shown decreased pain and increased mobility. See other objective measures below for additional details. Patient reports 50% improvement with overall involvement. Minor decline in FOTO score to 63 from 65 at initial assessment. All STG/LTGs achieved as identified below. Other Objective/Functional Measures:   - TTP (L) Lx paraspinal at T11-L2  - Decr ability for (L) SB with possible facet blockage  - Improved mobility (L) trunk SB after treatment  - Good progress with innominate mobility  - Add Quadratus Lumborum str with good tolerance  - Increase STM/DTM (R) Quadratus Lumborum  - - Treat with KT Tape trial (B) Lx paraspinals    Progress towards goals:  Short Term Goals: To be accomplished in 5 treatments:  1.  Pt will be compliant and independent with HEP in order to facilitate PT sessions and aid with self management                        Eval Status:  Initiated                        Current Status: Reports compliance 2/26/18  2.  Pt to tolerate 30 min or more of TE and/or Interventions w/o increased s/s                         Eval Status:  Initiated                        Current Status: Met at 35 min 2/9/18      Long Term Goals: To be accomplished in 10 treatments:  Progress towards goals / Updated goals:  1.  Pt will report 50% improvement or better with involvement to show a significant increase in function                        Eval Status:  Initiated                        Current Status: Met at 50% 2/26/18  2.  Pt will Demonstrate good pelvic alignment and mobility to aid with decreased pain of the Lx spine                        Eval Status:  Initiated                        Current Status: Progressing with good (B) Innominate mobility today 2/16/18  3.  Pt will have improved (B) HS length to 160* or better to allow good pelvic mobility and decreased pain to 1/10 or better to tolerate leaning over a sink to brush teeth w/o added pain                          Eval Status: 150 (B)                        Current Status: NT  4.  Pt will have good (R) multifidus initiation to aid in Lx stability with prolonged standing and leaning forward to brush teeth and perform usual work                          Eval Status:  Initiated                        Current Status: Pt showing good initiation (B) Multifidus 2/16/18  5.  Pt will improve FOTO score to 71 in 10 visits to show significant improvement in function for progress to little to no numbness/Neural tension of the (R) foot                        Eval Status: 65                        Current Status: Declined to 63 2/26/18               Updated Goals: to be achieved in 4-10 visits:  Continue with current goals    ASSESSMENT/RECOMMENDATIONS:  [x]Continue therapy per initial plan/protocol at a frequency of  2-3 x per week for 10 treatments  []Continue therapy with the following recommended changes:_____________________      _____________________________________________________________________  []Discontinue therapy progressing towards or have reached established goals  []Discontinue therapy due to lack of appreciable progress towards goals  []Discontinue therapy due to lack of attendance or compliance  []Await Physician's recommendations/decisions regarding therapy  []Other:________________________________________________________________    Thank you for this referral.   Lorie Mercedes, PT 2/26/2018 6:06 PM  NOTE TO PHYSICIAN:  [address]   FAX TO [facility]: [phone]  If you are unable to process this request in 24 hours please contact our office: [phone]    []  I have read the above report and request that my patient continue as recommended. []  I have read the above report and request that my patient continue therapy with the following changes/special instructions:________________________________________  []I have read the above report and request that my patient be discharged from therapy.     Physicians signature: ______________________________Date: _____Time:______

## 2018-02-28 ENCOUNTER — HOSPITAL ENCOUNTER (OUTPATIENT)
Dept: PHYSICAL THERAPY | Age: 58
Discharge: HOME | End: 2018-02-28
Payer: COMMERCIAL

## 2018-02-28 PROCEDURE — 97110 THERAPEUTIC EXERCISES: CPT

## 2018-02-28 PROCEDURE — 97032 APPL MODALITY 1+ESTIM EA 15: CPT

## 2018-02-28 PROCEDURE — 97140 MANUAL THERAPY 1/> REGIONS: CPT

## 2018-03-01 NOTE — PROGRESS NOTES
PT DAILY TREATMENT NOTE 3-16    Patient Name: Carie Lopez MD  Date:2018  : 1960  [x]  Patient  Verified  Payor: Katrin Manzo / Plan: [facility] [employer] / Product Type: Commerical /    In time:4:32  Out time:5:37  Total Treatment Time (min): 65  Visit #: 1 of 10    Treatment Area: Low back pain [M54.5]    SUBJECTIVE  Pain Level (0-10 scale): 2 (R) side 6 (L) side  Any medication changes, allergies to medications, adverse drug reactions, diagnosis change, or new procedure performed?: [x] No    [] Yes (see summary sheet for update)  Subjective functional status/changes:   [] No changes reported  Doing better, less pain. Primary pain is still on the (L) side.     OBJECTIVE  Modality rationale: decrease inflammation, decrease pain and increase tissue extensibility to improve the patients ability to perform increased ADL   Min Type Additional Details    [] Estim:  []Unatt       []IFC  []Premod                        []Other:  []w/ice   []w/heat  Position:  Location:   8 [x] Estim: [x]Att    []TENS instruct  []NMES                    [x]Other:  []w/US   []w/ice   []w/heat  Position: Prone  Location: (B) Lx/Tx paraspinals    []  Traction: [] Cervical       []Lumbar                       [] Prone          []Supine                       []Intermittent   []Continuous Lbs:  [] before manual  [] after manual    []  Ultrasound: []Continuous   [] Pulsed                           []1MHz   []3MHz Location:  W/cm2:    []  Iontophoresis with dexamethasone         Location: [] Take home patch   [] In clinic    []  Ice     []  heat  []  Ice massage  []  Laser   []  Anodyne Position:  Location:    []  Laser with stim  []  Other: Position:  Location:    []  Vasopneumatic Device Pressure:       [] lo [] med [] hi   Temperature: [] lo [] med [] hi   [x] Skin assessment post-treatment:  [x]intact []redness- no adverse reaction    []redness  adverse reaction:        24 min Therapeutic Exercise:  [x] See flow sheet : Assess mobility (L) Lx region   Rationale: increase ROM, increase strength and improve coordination to improve the patients ability to perform increased ADL  12 min Manual Therapy:  STM/TPR (B) Lx Paraspinals, Lx/Tx Rot mobs, Lx/Tx (L) SB mobs, (B) LE LAD   Rationale: decrease pain, increase ROM, increase tissue extensibility and decrease trigger points to perform increased ADL  8 min Neuromuscular Re-education:  []  See flow sheet : KT I-Strip for spacing at (B) Lx paraspinals    Rationale: increase ROM, increase strength and improve coordination  to improve the patients ability to perform increased ADL          With   [x] TE   [] TA   [x] neuro   [] other: Patient Education: [x] Review HEP    [] Progressed/Changed HEP based on:   [] positioning   [] body mechanics   [] transfers   [] heat/ice application    [] other:      Other Objective/Functional Measures:   - TTP (B) Lx paraspinal at T11-L2 left > right  - imprive ability for left side bend after treatment    Pain Level (0-10 scale) post treatment: 2-3     ASSESSMENT/Changes in Function:        Patient will continue to benefit from skilled PT services to modify and progress therapeutic interventions, address functional mobility deficits, address ROM deficits, address strength deficits, analyze and address soft tissue restrictions and analyze and cue movement patterns to attain remaining goals.      []  See Plan of Care  [x]  See progress note/recertification  []  See Discharge Summary         Progress towards goals / Updated goals:  Short Term Goals: To be accomplished in 5 treatments:  1.  Pt will be compliant and independent with HEP in order to facilitate PT sessions and aid with self management                        Eval Status:  Initiated                        Current Status:  2.  Pt to tolerate 30 min or more of TE and/or Interventions w/o increased s/s                        Eval Status:  Initiated                        Current Status: Met at 35 min 2/9/18      Long Term Goals: To be accomplished in 10 treatments:  Progress towards goals / Updated goals:  1.  Pt will report 50% improvement or better with involvement to show a significant increase in function                        Eval Status:  Initiated                        NXGOKPX Status: Met at 50% 2/26/18  2.  Pt will Demonstrate good pelvic alignment and mobility to aid with decreased pain of the Lx spine                        Eval Status:  Initiated                        Current Status: Progressing with good (B) Innominate mobility today 2/16/18  3.  Pt will have improved (B) HS length to 160* or better to allow good pelvic mobility and decreased pain to 1/10 or better to tolerate leaning over a sink to brush teeth w/o added pain                          Eval Status: 150 (B)                        Current Status: NT  4.  Pt will have good (R) multifidus initiation to aid in Lx stability with prolonged standing and leaning forward to brush teeth and perform usual work                          Eval Status:  Initiated                        Current Status: Pt showing good initiation (B) Multifidus 2/16/18  5.  Pt will improve FOTO score to 71 in 10 visits to show significant improvement in function for progress to little to no numbness/Neural tension of the (R) foot                        Eval Status: 65                        Current Status: Declined to 63 2/26/18  PLAN  [x]  Upgrade activities as tolerated     [x]  Continue plan of care  []  Update interventions per flow sheet       []  Discharge due to:_  []  Other:_      Cori Richter, PT 2/28/2018  7:12 PM    Future Appointments  Date Time Provider Cielo Bailey   3/12/2018 5:30 PM Shira Ann, PT [facility]   3/14/2018 5:00 PM Shira Ann, PT [facility]   3/22/2018 7:30 AM Cori Richter PT [facility]   3/26/2018 4:30 PM Cori Richter PT [facility]   3/28/2018 4:30 PM Cori Richter PT [facility]   10/9/2018 8:30 AM [facility] [id number] [address]    10/16/2018 3:30 PM Festus Pascual MD [address] [place] [address]

## 2018-03-12 ENCOUNTER — HOSPITAL ENCOUNTER (OUTPATIENT)
Dept: PHYSICAL THERAPY | Age: 58
Discharge: HOME | End: 2018-03-12
Payer: COMMERCIAL

## 2018-03-12 PROCEDURE — 97110 THERAPEUTIC EXERCISES: CPT

## 2018-03-12 PROCEDURE — 97530 THERAPEUTIC ACTIVITIES: CPT

## 2018-03-12 NOTE — PROGRESS NOTES
PT DAILY TREATMENT NOTE 3-16    Patient Name: Michelle Salas MD  Date:3/12/2018  : 1960  [x]  Patient  Verified  Payor: Chandrakant Sesay / Plan: [facility] / Product Type: Commerical /    In time: 5:35  Out time: 6:26  Total Treatment Time (min): 51  Visit #: 2 of 10    Treatment Area: Low back pain [M54.5]    SUBJECTIVE  Pain Level (0-10 scale): 2   Any medication changes, allergies to medications, adverse drug reactions, diagnosis change, or new procedure performed?: [x] No    [] Yes (see summary sheet for update)  Subjective functional status/changes:   [] No changes reported  Acute Left side pain is gone. OBJECTIVE         40 min Therapeutic Exercise:  [x] See flow sheet : Assess mobility (L) Lx region   Rationale: increase ROM, increase strength and improve coordination to improve the patients ability to perform increased ADL  11 min Therapeutic Activity:  [x]  See flow sheet :   Rationale: increase ROM, increase strength and improve coordination  to improve the patients ability to perform increased ADL           With   [x] TE   [] TA   [x] neuro   [] other: Patient Education: [x] Review HEP    [] Progressed/Changed HEP based on:   [] positioning   [] body mechanics   [] transfers   [] heat/ice application    [] other:      Other Objective/Functional Measures:   - Improved tolerance with exercises  - increased multifidus initiation  - Add TM, Pt tolerated 1/2 mile      Pain Level (0-10 scale) post treatment: 1-2     ASSESSMENT/Changes in Function:        Patient will continue to benefit from skilled PT services to modify and progress therapeutic interventions, address functional mobility deficits, address ROM deficits, address strength deficits, analyze and address soft tissue restrictions and analyze and cue movement patterns to attain remaining goals.      []  See Plan of Care  [x]  See progress note/recertification  []  See Discharge Summary         Progress towards goals / Updated goals:  Short Term Goals: To be accomplished in 5 treatments:  1.  Pt will be compliant and independent with HEP in order to facilitate PT sessions and aid with self management                        Eval Status:  Initiated                        Current Status:  2.  Pt to tolerate 30 min or more of TE and/or Interventions w/o increased s/s                        Eval Status:  Initiated                        Current Status: Met at 35 min 2/9/18      Long Term Goals: To be accomplished in 10 treatments:  Progress towards goals / Updated goals:  1.  Pt will report 50% improvement or better with involvement to show a significant increase in function                        Eval Status:  Initiated                        Current Status: Met at 50% 2/26/18  2.  Pt will Demonstrate good pelvic alignment and mobility to aid with decreased pain of the Lx spine                        Eval Status:  Initiated                        Current Status: Progressing with good (B) Innominate mobility today 2/16/18  3.  Pt will have improved (B) HS length to 160* or better to allow good pelvic mobility and decreased pain to 1/10 or better to tolerate leaning over a sink to brush teeth w/o added pain                          Eval Status: 150 (B)                        Current Status: NT  4.  Pt will have good (R) multifidus initiation to aid in Lx stability with prolonged standing and leaning forward to brush teeth and perform usual work                          Eval Status:  Initiated                        Current Status: Met 3/12/18  5.  Pt will improve FOTO score to 71 in 10 visits to show significant improvement in function for progress to little to no numbness/Neural tension of the (R) foot                        Eval Status: 65                        Current Status: Declined to 63 2/26/18  PLAN  [x]  Upgrade activities as tolerated     [x]  Continue plan of care  []  Update interventions per flow sheet       []  Discharge due to:_  []  Other:_      Hank Weiner, PT 3/12/2018  7:12 PM    Future Appointments  Date Time Provider Cielo Bailey   3/14/2018 5:00 PM Bri Fair, PT [facility]   3/22/2018 7:30 AM Hank Weiner, PT [facility]   3/26/2018 4:30 PM Hank Weiner, PT [facility]   3/28/2018 4:30 PM Hank Weiner, PT [facility]   10/9/2018 8:30 AM [address]   10/16/2018 3:30 PM Yrn Saravia MD [facility] [facility]

## 2018-03-14 ENCOUNTER — HOSPITAL ENCOUNTER (OUTPATIENT)
Dept: PHYSICAL THERAPY | Age: 58
Discharge: HOME | End: 2018-03-14
Payer: COMMERCIAL

## 2018-03-14 PROCEDURE — 97110 THERAPEUTIC EXERCISES: CPT

## 2018-03-14 NOTE — PROGRESS NOTES
PT DAILY TREATMENT NOTE 3-16    Patient Name: Reginald Head MD  Date:3/14/2018  : 1960  [x]  Patient  Verified  Payor: Scott Santos / Plan: [facility] / Product Type: Commerical /    In time: 4:34  Out time: 5:24  Total Treatment Time (min): 50  Visit #: 3 of 10    Treatment Area: Low back pain [M54.5]    SUBJECTIVE  Pain Level (0-10 scale): 1-2   Any medication changes, allergies to medications, adverse drug reactions, diagnosis change, or new procedure performed?: [x] No    [] Yes (see summary sheet for update)  Subjective functional status/changes:   [] No changes reported  A little pain on the right side    OBJECTIVE       Modality rationale: decrease inflammation, decrease pain and increase tissue extensibility to improve the patients ability to perform increased ADL   Min Type Additional Details    [] Estim:  []Unatt       []IFC  []Premod                        []Other:  []w/ice   []w/heat  Position:  Location:    [] Estim: []Att    []TENS instruct  []NMES                    []Other:  []w/US   []w/ice   []w/heat  Position:  Location:    []  Traction: [] Cervical       []Lumbar                       [] Prone          []Supine                       []Intermittent   []Continuous Lbs:  [] before manual  [] after manual    []  Ultrasound: []Continuous   [] Pulsed                           []1MHz   []3MHz Location:  W/cm2:    []  Iontophoresis with dexamethasone         Location: [] Take home patch   [] In clinic   10 [x]  Ice     []  heat  []  Ice massage  []  Laser   []  Anodyne Position: Prone  Location: Lx    []  Laser with stim  []  Other: Position:  Location:    []  Vasopneumatic Device Pressure:       [] lo [] med [] hi   Temperature: [] lo [] med [] hi   [x] Skin assessment post-treatment:  [x]intact []redness- no adverse reaction    []redness  adverse reaction:       38 min Therapeutic Exercise:  [x] See flow sheet : Assess mobility (L) Lx region   Rationale: increase ROM, increase strength and improve coordination to improve the patients ability to perform increased ADL  2 min Neuromuscular Re-education:  []  See flow sheet : KT I-Strip for spacing at the Right Lx paraspinals    Rationale: increase ROM, increase strength, improve coordination and Inhibit tissue tightness  to improve the patients ability to perform increased ADL            With   [x] TE   [] TA   [x] neuro   [] other: Patient Education: [x] Review HEP    [] Progressed/Changed HEP based on:   [] positioning   [] body mechanics   [] transfers   [] heat/ice application    [] other:      Other Objective/Functional Measures:   - Fair to good innominate mobility  - Slight decreased Left Innominate mobility with stork stance  - Tigt RIght Lx paraspinals      Pain Level (0-10 scale) post treatment: 2     ASSESSMENT/Changes in Function:        Patient will continue to benefit from skilled PT services to modify and progress therapeutic interventions, address functional mobility deficits, address ROM deficits, address strength deficits, analyze and address soft tissue restrictions and analyze and cue movement patterns to attain remaining goals.      []  See Plan of Care  [x]  See progress note/recertification  []  See Discharge Summary         Progress towards goals / Updated goals:  Short Term Goals: To be accomplished in 5 treatments:  1.  Pt will be compliant and independent with HEP in order to facilitate PT sessions and aid with self management                        Eval Status:  Initiated                        Current Status:  2.  Pt to tolerate 30 min or more of TE and/or Interventions w/o increased s/s                        Eval Status:  Initiated                        Current Status: Met at 35 min 2/9/18      Long Term Goals: To be accomplished in 10 treatments:  Progress towards goals / Updated goals:  1.  Pt will report 50% improvement or better with involvement to show a significant increase in function                        Eval Status:  Initiated                        JHZTGII Status: Met at 50% 2/26/18  2.  Pt will Demonstrate good pelvic alignment and mobility to aid with decreased pain of the Lx spine                        Eval Status:  Initiated                        ITCMFKI Status: Progressing with good (B) Innominate mobility today 2/16/18  3.  Pt will have improved (B) HS length to 160* or better to allow good pelvic mobility and decreased pain to 1/10 or better to tolerate leaning over a sink to brush teeth w/o added pain                          Eval Status: 150 (B)                        Current Status: NT  4.  Pt will have good (R) multifidus initiation to aid in Lx stability with prolonged standing and leaning forward to brush teeth and perform usual work                          Eval Status:  Initiated                        Current Status: Met 3/12/18  5.  Pt will improve FOTO score to 71 in 10 visits to show significant improvement in function for progress to little to no numbness/Neural tension of the (R) foot                        Eval Status: 65                        Current Status: Declined to 63 2/26/18  PLAN  [x]  Upgrade activities as tolerated     [x]  Continue plan of care  []  Update interventions per flow sheet       []  Discharge due to:_  []  Other:_      Lyndsey Henderson, PT 3/14/2018  7:12 PM    Future Appointments  Date Time Provider Cielo Bailey   3/22/2018 7:30 AM Lyndsey Henderson, PT [facility]   3/26/2018 4:30 PM Lyndsey Henderson, PT [facility]   3/28/2018 4:30 PM Lyndsey Henderson, PT [facility]   10/9/2018 8:30 AM [facility] [address]   10/16/2018 3:30 PM Swapna Brunner MD [address]

## 2018-03-22 ENCOUNTER — HOSPITAL ENCOUNTER (OUTPATIENT)
Dept: PHYSICAL THERAPY | Age: 58
Discharge: HOME | End: 2018-03-22
Payer: COMMERCIAL

## 2018-03-22 PROCEDURE — 97110 THERAPEUTIC EXERCISES: CPT

## 2018-03-22 PROCEDURE — 97140 MANUAL THERAPY 1/> REGIONS: CPT

## 2018-03-22 PROCEDURE — 97112 NEUROMUSCULAR REEDUCATION: CPT

## 2018-03-22 NOTE — PROGRESS NOTES
PT DAILY TREATMENT NOTE 3-16    Patient Name: Jennifer Sharma MD  Date:3/22/2018  : 1960  [x]  Patient  Verified  Payor: Taylor Mccarty / [facility] [employer]  [facility] / Product Type: Commerical /    In time:730  Out [id number]  Total Treatment Time (min): 55  Visit #: 4 of 10    Treatment Area: Low back pain [M54.5]    SUBJECTIVE  Pain Level (0-10 scale): 3  Any medication changes, allergies to medications, adverse drug reactions, diagnosis change, or new procedure performed?: [x] No    [] Yes (see summary sheet for update)  Subjective functional status/changes:   [] No changes reported  Continued pain at right Lx paraspinal/quadratus Lumborum.   Numbness at lateral right foot/toes    OBJECTIVE  33 min Therapeutic Exercise:  [x] See flow sheet :   Rationale: increase ROM, increase strength and improve coordination to improve the patients ability to perform increased ADL  14 min Manual Therapy:  STM/DTM right Lx paraspinals   Rationale: decrease pain, increase ROM, increase tissue extensibility and decrease trigger points to perform increased ADL  8 min Neuromuscular Re-education:  []  See flow sheet :KT I-Strip for spacing at the right Lx paraspinal   Rationale: increase ROM, increase strength, improve coordination and Inhibit tissue tightness  to improve the patients ability to perform increased ADL          With   [x] TE   [] TA   [] neuro   [] other: Patient Education: [x] Review HEP    [] Progressed/Changed HEP based on:   [] positioning   [] body mechanics   [] transfers   [] heat/ice application    [] other:      Other Objective/Functional Measures:   - Decr mobility (B) Innominate in stork stance  - Continued tightness with HS 90/90 Right 142  Left 150  -  decr multifidus initiation Right side  - Fair to good isolation of Right Multifidus  - Adjust Multifidus isolation exercises  - - Add Isolation with LE stab for Right Lx multifidus   - - Add Isolation with LE/UE stabs for Rigth Lx multifidus     Pain Level (0-10 scale) post treatment: 2    ASSESSMENT/Changes in Function:      Patient will continue to benefit from skilled PT services to modify and progress therapeutic interventions, address functional mobility deficits, address ROM deficits, address strength deficits, analyze and address soft tissue restrictions, analyze and cue movement patterns and analyze and modify body mechanics/ergonomics to attain remaining goals.      []  See Plan of Care  []  See progress note/recertification  []  See Discharge Summary         Progress towards goals / Updated goals:  Short Term Goals: To be accomplished in 5 treatments:  1.  Pt will be compliant and independent with HEP in order to facilitate PT sessions and aid with self management                        Eval Status:  Initiated                        Current Status:  2.  Pt to tolerate 30 min or more of TE and/or Interventions w/o increased s/s                        Eval Status:  Initiated                        Current Status: Met at 35 min 2/9/18      Long Term Goals: To be accomplished in 10 treatments:  Progress towards goals / Updated goals:  1.  Pt will report 50% improvement or better with involvement to show a significant increase in function                        Eval Status:  Initiated                        Current Status: Met at 50% 2/26/18  2.  Pt will Demonstrate good pelvic alignment and mobility to aid with decreased pain of the Lx spine                        Eval Status:  Initiated                        Current Status: Progressing with good (B) Innominate mobility today 2/16/18  3.  Pt will have improved (B) HS length to 160* or better to allow good pelvic mobility and decreased pain to 1/10 or better to tolerate leaning over a sink to brush teeth w/o added pain                          Eval Status: 150 (B)                        Current Status: No change 3/22/18  4.  Pt will have good (R) multifidus initiation to aid in Lx stability with prolonged standing and leaning forward to brush teeth and perform usual work                          Eval Status:  Initiated                        GHUBYMK Status: Slight decreased initiation today 3/22/18  5.  Pt will improve FOTO score to 71 in 10 visits to show significant improvement in function for progress to little to no numbness/Neural tension of the (R) foot                        Eval Status: 65                        Current Status: Declined to 63 2/26/18  PLAN  [x]  Upgrade activities as tolerated     []  Continue plan of care  []  Update interventions per flow sheet       []  Discharge due to:_  []  Other:_      Karen Becerra, PT 3/22/2018  7:38 AM    Future Appointments  Date Time Provider Cielo Bailey   3/26/2018 4:30 PM Karen Becerra, PT [facility]   3/28/2018 4:30 PM Karen Becerra, PT [facility]   4/2/2018 5:00 PM Karen Becerra, PT [facility]   4/6/2018 5:30 PM Karen Becerra, PT [facility]   4/16/2018 5:00 PM Karen Becerra, PT [facility]   4/25/2018 5:00 PM Karen Becerra, PT [facility]   10/9/2018 8:30 AM [facility] [address]   10/16/2018 3:30 PM James Ness MD [address] [facility]

## 2018-03-26 ENCOUNTER — HOSPITAL ENCOUNTER (OUTPATIENT)
Dept: PHYSICAL THERAPY | Age: 58
Discharge: HOME | End: 2018-03-26
Payer: COMMERCIAL

## 2018-03-26 PROCEDURE — 97140 MANUAL THERAPY 1/> REGIONS: CPT

## 2018-03-26 PROCEDURE — 97112 NEUROMUSCULAR REEDUCATION: CPT

## 2018-03-26 PROCEDURE — 97032 APPL MODALITY 1+ESTIM EA 15: CPT

## 2018-03-26 PROCEDURE — 97110 THERAPEUTIC EXERCISES: CPT

## 2018-03-26 NOTE — PROGRESS NOTES
PT DAILY TREATMENT NOTE 3-16    Patient Name: Rojas Rae MD  Date:3/26/2018  : 1960  [x]  Patient  Verified  Payor: Richi Rodriguez / Plan: [place] [employer]  CAPITATED PT / Product Type: Commerical /    In time:4:30 Out time: 5:37  Total Treatment Time (min): 62  Visit #: 5 of 10    Treatment Area: Low back pain [M54.5]  SUBJECTIVE  Pain Level (0-10 scale): 3  Any medication changes, allergies to medications, adverse drug reactions, diagnosis change, or new procedure performed?: [x] No    [] Yes (see summary sheet for update)  Subjective functional status/changes:   [] No changes reported  Continued pain at right Lx paraspinal/quadratus Lumborum.   Numbness at lateral right foot/toes    OBJECTIVE  Modality rationale: decrease inflammation, decrease pain and increase tissue extensibility to improve the patients ability to perform increased ADL   Min Type Additional Details    [] Estim:  []Unatt       []IFC  []Premod                        []Other:  []w/ice   []w/heat  Position:  Location:   8 [x] Estim: [x]Att    []TENS instruct  []NMES                    [x]Other:  []w/US   []w/ice   []w/heat  Position: Propne  Location: Right Lx paraspinals/Quadratus Lumborum    []  Traction: [] Cervical       []Lumbar                       [] Prone          []Supine                       []Intermittent   []Continuous Lbs:  [] before manual  [] after manual    []  Ultrasound: []Continuous   [] Pulsed                           []1MHz   []3MHz Location:  W/cm2:    []  Iontophoresis with dexamethasone         Location: [] Take home patch   [] In clinic    []  Ice     []  heat  []  Ice massage  []  Laser   []  Anodyne Position:  Location:    []  Laser with stim  []  Other: Position:  Location:    []  Vasopneumatic Device Pressure:       [] lo [] med [] hi   Temperature: [] lo [] med [] hi   [x] Skin assessment post-treatment:  [x]intact []redness- no adverse reaction    []redness  adverse reaction:     14 min Therapeutic Exercise:  [x] See flow sheet :   Rationale: increase ROM, increase strength and improve coordination to improve the patients ability to perform increased ADL  8 min Manual Therapy:  STM/DTM/TP Rright Lx paraspinals,    Rationale: decrease pain, increase ROM, increase tissue extensibility and decrease trigger points to perform increased ADL  32 min Neuromuscular Re-education:  [x]  See flow sheet :KT I-Strip for spacing at the right Lx paraspinal, Increased Cor stab exercises with focus on right multifidus initiation   Rationale: increase ROM, increase strength, improve coordination and Inhibit tissue tightness  to improve the patients ability to perform increased ADL          With   [x] TE   [] TA   [] neuro   [] other: Patient Education: [x] Review HEP    [] Progressed/Changed HEP based on:   [] positioning   [] body mechanics   [] transfers   [] heat/ice application    [] other:      Other Objective/Functional Measures:   - TTP with muscle tightness right Lx paraspinal with a pinpoint nodual in the proximal quadratus lumborum  - - good response to Laser application with decreased muscle tightness and TTP after treatment    Pain Level (0-10 scale) post treatment: 2    ASSESSMENT/Changes in Function:      Patient will continue to benefit from skilled PT services to modify and progress therapeutic interventions, address functional mobility deficits, address ROM deficits, address strength deficits, analyze and address soft tissue restrictions, analyze and cue movement patterns and analyze and modify body mechanics/ergonomics to attain remaining goals.      []  See Plan of Care  []  See progress note/recertification  []  See Discharge Summary         Progress towards goals / Updated goals:  Short Term Goals: To be accomplished in 5 treatments:  1.  Pt will be compliant and independent with HEP in order to facilitate PT sessions and aid with self management                        Eval Status:  Initiated                        NQWHOVN Status:  2.  Pt to tolerate 30 min or more of TE and/or Interventions w/o increased s/s                        Eval Status:  Initiated                        TKJYJYC Status: Met at 35 min 2/9/18  [address] be accomplished in 10 treatments:  Progress towards goals / Updated goals:  1.  Pt will report 50% improvement or better with involvement to show a significant increase in function                        Eval Status:  Initiated                        PAZSHXE Status: Met at 50% 2/26/18  2.  Pt will Demonstrate good pelvic alignment and mobility to aid with decreased pain of the Lx spine                        Eval Status:  Initiated                        Current Status: Progressing with good (B) Innominate mobility today 2/16/18  3.  Pt will have improved (B) HS length to 160* or better to allow good pelvic mobility and decreased pain to 1/10 or better to tolerate leaning over a sink to brush teeth w/o added pain                          Eval Status: 150 (B)                        Current Status: No change 3/22/18  4.  Pt will have good (R) multifidus initiation to aid in Lx stability with prolonged standing and leaning forward to brush teeth and perform usual work                          Eval Status:  Initiated                        Current Status: Slight decreased initiation today 3/22/18  5.  Pt will improve FOTO score to 71 in 10 visits to show significant improvement in function for progress to little to no numbness/Neural tension of the (R) foot                        Eval Status: 65                        Current Status: Declined to 63 2/26/18  PLAN  [x]  Upgrade activities as tolerated     []  Continue plan of care  []  Update interventions per flow sheet       []  Discharge due to:_  []  Other:_      Sudha Shane PT 3/26/2018  7:38 AM    Future Appointments  Date Time Provider Cielo Bailey   3/28/2018 4:30 PM Maria Stephenson [address] [facility]   4/2/2018 5:00 PM Marquis Neither, PT [facility]   4/6/2018 5:30 PM Marquis Neither, PT [facility]   4/16/2018 5:00 PM Marquis Neither, PT [facility]   4/25/2018 5:00 PM Marquis Manley, PT [facility]   10/9/2018 8:30 AM [facility] [address]   10/16/2018 3:30 PM Tea Willard MD [address] [facility]

## 2018-03-28 ENCOUNTER — HOSPITAL ENCOUNTER (OUTPATIENT)
Dept: PHYSICAL THERAPY | Age: 58
Discharge: HOME | End: 2018-03-28
Payer: COMMERCIAL

## 2018-03-28 PROCEDURE — 97110 THERAPEUTIC EXERCISES: CPT

## 2018-03-28 PROCEDURE — 97032 APPL MODALITY 1+ESTIM EA 15: CPT

## 2018-03-28 PROCEDURE — 97112 NEUROMUSCULAR REEDUCATION: CPT

## 2018-03-28 NOTE — PROGRESS NOTES
PT DAILY TREATMENT NOTE 3-16    Patient Name: Bernice Cheney MD  Date:3/28/2018  : 1960  [x]  Patient  Verified  Payor: Brodie Ny / Plan: [place] [employer]  CAPITATED PT / Product Type: Commerical /    In time:4:40 Out time: 5:41  Total Treatment Time (min): 61  Visit #: 6 of 10    Treatment Area: Low back pain [M54.5]  SUBJECTIVE  Pain Level (0-10 scale): 0-1  Any medication changes, allergies to medications, adverse drug reactions, diagnosis change, or new procedure performed?: [x] No    [] Yes (see summary sheet for update)  Subjective functional status/changes:   [] No changes reported  Continued pain at right Lx paraspinal/quadratus Lumborum.   Numbness at lateral right foot/toes    OBJECTIVE  Modality rationale: decrease inflammation, decrease pain and increase tissue extensibility to improve the patients ability to perform increased ADL   Min Type Additional Details    [] Estim:  []Unatt       []IFC  []Premod                        []Other:  []w/ice   []w/heat  Position:  Location:   10 [x] Estim: [x]Att    []TENS instruct  []NMES                    [x]Other:  []w/US   []w/ice   []w/heat  Position: Propne  Location: Right Lx paraspinals/Quadratus Lumborum    []  Traction: [] Cervical       []Lumbar                       [] Prone          []Supine                       []Intermittent   []Continuous Lbs:  [] before manual  [] after manual    []  Ultrasound: []Continuous   [] Pulsed                           []1MHz   []3MHz Location:  W/cm2:    []  Iontophoresis with dexamethasone         Location: [] Take home patch   [] In clinic   10 [x]  Ice     []  heat  []  Ice massage  []  Laser   []  Anodyne Position: Prone  Location:Lx    []  Laser with stim  []  Other: Position:  Location:    []  Vasopneumatic Device Pressure:       [] lo [] med [] hi   Temperature: [] lo [] med [] hi   [x] Skin assessment post-treatment:  [x]intact []redness- no adverse reaction    []redness  adverse reaction:     14 min Therapeutic Exercise:  [x] See flow sheet :   Rationale: increase ROM, increase strength and improve coordination to improve the patients ability to perform increased ADL    27 min Neuromuscular Re-education:  [x]  See flow sheet :KT I-Strip for spacing at the right Lx paraspinal, Increased Cor stab exercises with focus on right multifidus initiation   Rationale: increase ROM, increase strength, improve coordination and Inhibit tissue tightness  to improve the patients ability to perform increased ADL          With   [x] TE   [] TA   [] neuro   [] other: Patient Education: [x] Review HEP    [] Progressed/Changed HEP based on:   [] positioning   [] body mechanics   [] transfers   [] heat/ice application    [] other:      Other Objective/Functional Measures:   - Decr TTP and muscle tightness at Right Lx paraspinal and proximal quadratus lumborum  - - good response to Laser application with decreased muscle tightness and TTP after treatment    Pain Level (0-10 scale) post treatment: 0-1    ASSESSMENT/Changes in Function:      Patient will continue to benefit from skilled PT services to modify and progress therapeutic interventions, address functional mobility deficits, address ROM deficits, address strength deficits, analyze and address soft tissue restrictions, analyze and cue movement patterns and analyze and modify body mechanics/ergonomics to attain remaining goals.      []  See Plan of Care  []  See progress note/recertification  []  See Discharge Summary         Progress towards goals / Updated goals:  Short Term Goals: To be accomplished in 5 treatments:  1.  Pt will be compliant and independent with HEP in order to facilitate PT sessions and aid with self management                        Eval Status:  Initiated                        Current Status:  2.  Pt to tolerate 30 min or more of TE and/or Interventions w/o increased s/s                        Eval Status:  Initiated                        Current Status: Met at 35 min 2/9/18      Long Term Goals: To be accomplished in 10 treatments:  Progress towards goals / Updated goals:  1.  Pt will report 50% improvement or better with involvement to show a significant increase in function                        Eval Status:  Initiated                        NPDSYWA Status: Met at 50% 2/26/18  2.  Pt will Demonstrate good pelvic alignment and mobility to aid with decreased pain of the Lx spine                        Eval Status:  Initiated                        Current Status: Progressing with good (B) Innominate mobility today 3/26/18  3.  Pt will have improved (B) HS length to 160* or better to allow good pelvic mobility and decreased pain to 1/10 or better to tolerate leaning over a sink to brush teeth w/o added pain                          Eval Status: 150 (B)                        Current Status: No change 3/22/18  4.  Pt will have good (R) multifidus initiation to aid in Lx stability with prolonged standing and leaning forward to brush teeth and perform usual work                          Eval Status:  Initiated                        Current Status: Slight decreased initiation today 3/22/18  5.  Pt will improve FOTO score to 71 in 10 visits to show significant improvement in function for progress to little to no numbness/Neural tension of the (R) foot                        Eval Status: 65                        Current Status: Declined to 63 2/26/18  PLAN  [x]  Upgrade activities as tolerated     []  Continue plan of care  []  Update interventions per flow sheet       []  Discharge due to:_  []  Other:_      Miguel Perkins, PT 3/28/2018  7:38 AM    Future Appointments  Date Time Provider Cielo Bailey   4/2/2018 5:00 PM Ulysess Gregorio, PT [facility]   4/6/2018 5:30 PM Ulysess Neat, PT [facility]   4/16/2018 5:00 PM Ulysess Neat, PT [facility]   4/25/2018 5:00 PM Ulysess Neat, PT [facility]   10/9/2018 8:30 AM [address] 10/16/2018 3:30 PM Yrn Peng MD [address]

## 2018-04-02 ENCOUNTER — HOSPITAL ENCOUNTER (OUTPATIENT)
Dept: PHYSICAL THERAPY | Age: 58
Discharge: HOME | End: 2018-04-02
Payer: COMMERCIAL

## 2018-04-02 PROCEDURE — 97112 NEUROMUSCULAR REEDUCATION: CPT

## 2018-04-02 PROCEDURE — 97110 THERAPEUTIC EXERCISES: CPT

## 2018-04-02 PROCEDURE — 97032 APPL MODALITY 1+ESTIM EA 15: CPT

## 2018-04-02 NOTE — PROGRESS NOTES
PT DAILY TREATMENT NOTE 3-16    Patient Name: Saira Pena MD  Date:2018  : 1960  [x]  Patient  Verified  Payor: Fabby Ta / Plan: [place] [employer]  [facility] PT / Product Type: Commerical /    In time:5:05 Out time: 6:05  Total Treatment Time (min): 56  Visit #: 1 of 10    Treatment Area: Low back pain [M54.5]  SUBJECTIVE  Pain Level (0-10 scale): 1  Any medication changes, allergies to medications, adverse drug reactions, diagnosis change, or new procedure performed?: [x] No    [] Yes (see summary sheet for update)  Subjective functional status/changes:   [] No changes reported  Improved pain at the Right Lx region with improved ability to work in the yard. Numbness improved 30-40%, Lx 70-75% improvement.     OBJECTIVE  Modality rationale: decrease inflammation, decrease pain and increase tissue extensibility to improve the patients ability to perform increased ADL   Min Type Additional Details    [] Estim:  []Unatt       []IFC  []Premod                        []Other:  []w/ice   []w/heat  Position:  Location:   8 [x] Estim: [x]Att    []TENS instruct  []NMES                    [x]Other:  []w/US   []w/ice   []w/heat  Position: Propne  Location: Right Lx paraspinals/Quadratus Lumborum    []  Traction: [] Cervical       []Lumbar                       [] Prone          []Supine                       []Intermittent   []Continuous Lbs:  [] before manual  [] after manual    []  Ultrasound: []Continuous   [] Pulsed                           []1MHz   []3MHz Location:  W/cm2:    []  Iontophoresis with dexamethasone         Location: [] Take home patch   [] In clinic   10 [x]  Ice     []  heat  []  Ice massage  []  Laser   []  Anodyne Position: Prone  Location:Lx    []  Laser with stim  []  Other: Position:  Location:    []  Vasopneumatic Device Pressure:       [] lo [] med [] hi   Temperature: [] lo [] med [] hi   [x] Skin assessment post-treatment:  [x]intact []redness- no adverse reaction    []redness  adverse reaction:     14 min Therapeutic Exercise:  [x] See flow sheet :   Rationale: increase ROM, increase strength and improve coordination to improve the patients ability to perform increased ADL    24 min Neuromuscular Re-education:  [x]  See flow sheet :   Rationale: increase ROM, increase strength, improve coordination and Inhibit tissue tightness  to improve the patients ability to perform increased ADL          With   [x] TE   [] TA   [] neuro   [] other: Patient Education: [x] Review HEP    [] Progressed/Changed HEP based on:   [] positioning   [] body mechanics   [] transfers   [] heat/ice application    [] other:      Other Objective/Functional Measures:   - Decr TTP and muscle tightness at Right Lx paraspinal and proximal quadratus lumborum  - - good response to Laser application with decreased muscle tightness and TTP after treatment  - HS 90/90 Right 153 Left 157    Pain Level (0-10 scale) post treatment: 0-1    ASSESSMENT/Changes in Function:      Patient will continue to benefit from skilled PT services to modify and progress therapeutic interventions, address functional mobility deficits, address ROM deficits, address strength deficits, analyze and address soft tissue restrictions, analyze and cue movement patterns and analyze and modify body mechanics/ergonomics to attain remaining goals.      []  See Plan of Care  []  See progress note/recertification  []  See Discharge Summary         Progress towards goals / Updated goals:  Short Term Goals: To be accomplished in 5 treatments:  1.  Pt will be compliant and independent with HEP in order to facilitate PT sessions and aid with self management                        Eval Status:  Initiated                        Current Status:  2.  Pt to tolerate 30 min or more of TE and/or Interventions w/o increased s/s                        Eval Status:  Initiated                        Current Status: Met at 35 min 2/9/18      Long Term Goals: To be accomplished in 10 treatments:  Progress towards goals / Updated goals:  1.  Pt will report 50% improvement or better with involvement to show a significant increase in function                        Eval Status:  Initiated                        XCFPDQH Status: Met at 50% 2/26/18  2.  Pt will Demonstrate good pelvic alignment and mobility to aid with decreased pain of the Lx spine                        Eval Status:  Initiated                        Current Status: Progressing with good (B) Innominate mobility today 3/26/18  3.  Pt will have improved (B) HS length to 160* or better to allow good pelvic mobility and decreased pain to 1/10 or better to tolerate leaning over a sink to brush teeth w/o added pain                          Eval Status: 150 (B)                        Current Status: No change 3/22/18  4.  Pt will have good (R) multifidus initiation to aid in Lx stability with prolonged standing and leaning forward to brush teeth and perform usual work                          Eval Status:  Initiated                        Current Status: Slight decreased initiation today 3/22/18  5.  Pt will improve FOTO score to 71 in 10 visits to show significant improvement in function for progress to little to no numbness/Neural tension of the (R) foot                        Eval Status: 65                        Current Status: Declined to 63 2/26/18  PLAN  [x]  Upgrade activities as tolerated     []  Continue plan of care  []  Update interventions per flow sheet       []  Discharge due to:_  []  Other:_      Car Krueger PT 4/2/2018  5:13 PM    Future Appointments  Date Time Provider Cielo Bailey   4/6/2018 5:30 PM Car Krueger PT [facility]   4/16/2018 5:00 PM Car Krueger PT [facility]   4/25/2018 5:00 PM Car Krueger PT [facility]   10/9/2018 8:30 AM [facility] [address]   10/16/2018 3:30 PM Cortney Altamirano MD [address]

## 2018-04-03 NOTE — PROGRESS NOTES
Aden Panda [address], [address] [place] [address], [address] [place], [place] [address],[place] [address]  [phone] fax    Progress Note  Patient name: Rob Martinez MD Start of Care: 1/3/2018   Referral source: Nelia Alonso MD : 1960                          Medical Diagnosis: Low back pain [M54.5] Onset Date:3 Months ago                          Treatment Diagnosis: Pelvic obliquity, Neural tension (R) LE   Prior Hospitalization: see medical history Provider#: [id number]   Medications: Verified on Patient summary List    Comorbidities: None noted   Prior Level of Function: (I) with normal activity with some back pain    Visits from Start of Care: 7    Missed Visits: 0    Established Goals:         Excellent           Good         Limited           None  [] Increased ROM   []  [x]  []  []  [] Increased Strength  []  [x]  []  []  [] Increased Mobility  []  [x]  []  []   [] Decreased Pain   []  [x]  []  []  [] Decreased Swelling  []  []  []  []    Key Functional Changes: Patient has shown good progress with this treatment program. Pain as of last visit was 1/10. Patient has shown decreased pain and increased strength, mobility, flexibility and tolerance to increased activity levels. See other objective measures below for additional details. Patient reports 30-40% improvement in right LE/foot numbness and 70-75% improvement with lumbar pain. All STG/LTGs achieved as identified below.       Fall Risk Assessment: Patient demonstrates no Fall Risk      Other Objective/Functional Measures:  - Continued TTP and muscle tightness at Right Lx paraspinal and proximal quadratus lumborum however at a lesser degree with 70-75% improvement in pain status, mobility and functional tolerance  - - Good response to Laser/E-Stim combo application over the last couple of visits with decreased muscle tightness and TTP after treatment  - - Despite decreased pain and muscle tightness in the involved area, pt continues to have a palpable nodule (possible muscle tissue? ?) at the right lumbar paraspinal and proximal quadratus lumborum region  - HS 90/90 Improved to Right 153 Left 157  - Improved core strength with increased initiation of (B) Lumbar Multifidus to aid with stability of the low back  - Good (B) Innominate mobility in Stork stance    Progress towards goals / Updated goals:  Short Term Goals: To be accomplished in 5 treatments:  1.  Pt will be compliant and independent with HEP in order to facilitate PT sessions and aid with self management                        Eval Status:  Initiated                        Current Status:  Pt demonstrates (I) with HEP 4/2/18  2.  Pt to tolerate 30 min or more of TE and/or Interventions w/o increased s/s                        Eval Status:  Initiated                        Current Status: Met at 35 min 2/9/18      Long Term Goals: To be accomplished in 10 treatments:  Progress towards goals / Updated goals:  1.  Pt will report 50% improvement or better with involvement to show a significant increase in function                        Eval Status:  Initiated                        Current Status: Met at 70-75% for Lx pain and progressing at 30-40% for LE numbness 4/2/18  2.  Pt will Demonstrate good pelvic alignment and mobility to aid with decreased pain of the Lx spine                        Eval Status:  Initiated                        Current Status: Progressing with good (B) Innominate mobility today 3/26/18  3.  Pt will have improved (B) HS length to 160* or better to allow good pelvic mobility and decreased pain to 1/10 or better to tolerate leaning over a sink to brush teeth w/o added pain                          Eval Status: 150 (B)                        Current Status: Progressing at Right 153 and Left 157  4.  Pt will have good (R) multifidus initiation to aid in Lx stability with prolonged standing and leaning forward to brush teeth and perform usual work                          Eval Status:  Initiated                        RQVXRIL Status: Good (B) Initiation 4/2/18  5.  Pt will improve FOTO score to 71 in 10 visits to show significant improvement in function for progress to little to no numbness/Neural tension of the (R) foot                        Eval Status: 65                        Current Status: Declined to 63 2/26/18    Updated Goals: to be achieved in 3 visits:  1.  Pt will report 50% improvement or better with involvement to show a significant increase in function                        PN Status: Met at 70-75% for Lx pain and progressing at 30-40% for LE numbness                         Current Status:   2.  Pt will Demonstrate good pelvic alignment and mobility to aid with decreased pain of the Lx spine                        PN Status: Progressing with good (B) Innominate mobility today                         Current Status:   3.  Pt will have improved (B) HS length to 160* or better to allow good pelvic mobility and decreased pain to 1/10 or better to tolerate leaning over a sink to brush teeth w/o added pain                          PN Status: Progressing at Right 153 and Left 157                        Current Status:   4.  Pt will have good (R) multifidus initiation to aid in Lx stability with prolonged standing and leaning forward to brush teeth and perform usual work                          PN Status:  Good (B) Initiation                        Current Status:    5.  Pt will improve FOTO score to 71 in 10 visits to show significant improvement in function for progress to little to no numbness/Neural tension of the (R) foot                        PN Status: Declined to 63                         Current Status:     ASSESSMENT/RECOMMENDATIONS:  [x]Continue therapy per initial plan/protocol at a frequency of  1 x per week for 3 weeks  [x]Continue therapy with the following recommended changes:  Add Lifts from floor to waist and waist to overhead to assess function   []Discontinue therapy progressing towards or have reached established goals  []Discontinue therapy due to lack of appreciable progress towards goals  []Discontinue therapy due to lack of attendance or compliance  []Await Physician's recommendations/decisions regarding therapy  []Other:________________________________________________________________    Thank you for this referral.   Alejandro Alvarenga, PT 4/3/2018 10:36 AM  NOTE TO PHYSICIAN:  [address] AND   FAX TO [facility]: [phone]  If you are unable to process this request in 24 hours please contact our office: [phone]    []  I have read the above report and request that my patient continue as recommended. []  I have read the above report and request that my patient continue therapy with the following changes/special instructions:________________________________________  []I have read the above report and request that my patient be discharged from therapy.     Physicians signature: ______________________________Date: _____Time:______

## 2018-04-06 ENCOUNTER — HOSPITAL ENCOUNTER (OUTPATIENT)
Dept: PHYSICAL THERAPY | Age: 58
Discharge: HOME | End: 2018-04-06
Payer: COMMERCIAL

## 2018-04-06 PROCEDURE — 97012 MECHANICAL TRACTION THERAPY: CPT

## 2018-04-06 PROCEDURE — 97112 NEUROMUSCULAR REEDUCATION: CPT

## 2018-04-06 PROCEDURE — 97110 THERAPEUTIC EXERCISES: CPT

## 2018-04-06 NOTE — PROGRESS NOTES
PT DAILY TREATMENT NOTE 3-16    Patient Name: Harriet Jalloh MD  Date:2018  : 1960  [x]  Patient  Verified  Payor: Jamal Paula / Plan: [employer]  CAPITATED PT / Product Type: Commerical /    In time:5:30 Out time: 6:26  Total Treatment Time (min): 56  Visit #: 2 of 10    Treatment Area: Low back pain [M54.5]  SUBJECTIVE  Pain Level (0-10 scale): 2-3  Any medication changes, allergies to medications, adverse drug reactions, diagnosis change, or new procedure performed?: [x] No    [] Yes (see summary sheet for update)  Subjective functional status/changes:   [] No changes reported  Still doing well mobility is better, very little pain in the low back, intermittent numbness in right foot. Went to the orthopedic specialist who recommends that he tries Lx traction.       OBJECTIVE  Modality rationale: decrease inflammation, decrease pain and increase tissue extensibility to improve the patients ability to perform increased ADL   Min Type Additional Details    [] Estim:  []Unatt       []IFC  []Premod                        []Other:  []w/ice   []w/heat  Position:  Location:    [] Estim: []Att    []TENS instruct  []NMES                    []Other:  []w/US   []w/ice   []w/heat  Position: Propne  Location: Right Lx paraspinals/Quadratus Lumborum   20 [x]  Traction: [] Cervical       [x]Lumbar                       [] Prone          [x]Supine                       []Intermittent   []Continuous Lbs: 60 Max  36 Min  [] before manual  [] after manual    []  Ultrasound: []Continuous   [] Pulsed                           []1MHz   []3MHz Location:  W/cm2:    []  Iontophoresis with dexamethasone         Location: [] Take home patch   [] In clinic    []  Ice     []  heat  []  Ice massage  []  Laser   []  Anodyne Position: Prone  Location:Lx    []  Laser with stim  []  Other: Position:  Location:    []  Vasopneumatic Device Pressure:       [] lo [] med [] hi   Temperature: [] lo [] med [] hi   [x] Skin assessment post-treatment:  [x]intact []redness- no adverse reaction    []redness  adverse reaction:     16 min Therapeutic Exercise:  [x] See flow sheet :   Rationale: increase ROM, increase strength and improve coordination to improve the patients ability to perform increased ADL    20 min Neuromuscular Re-education:  [x]  See flow sheet :   Rationale: increase ROM, increase strength, improve coordination and Inhibit tissue tightness  to improve the patients ability to perform increased ADL          With   [x] TE   [] TA   [] neuro   [] other: Patient Education: [x] Review HEP    [] Progressed/Changed HEP based on:   [] positioning   [] body mechanics   [] transfers   [] heat/ice application    [] other:      Other Objective/Functional Measures:   - Trial Traction today with good results  - pt reports decreased pain but not sure if there was much change with the Right LE numbness due to it being intermittent. Pain Level (0-10 scale) post treatment: 1    ASSESSMENT/Changes in Function:      Patient will continue to benefit from skilled PT services to modify and progress therapeutic interventions, address functional mobility deficits, address ROM deficits, address strength deficits, analyze and address soft tissue restrictions, analyze and cue movement patterns and analyze and modify body mechanics/ergonomics to attain remaining goals.      []  See Plan of Care  []  See progress note/recertification  []  See Discharge Summary         Progress towards goals / Updated goals:  Short Term Goals: To be accomplished in 5 treatments:  1.  Pt will be compliant and independent with HEP in order to facilitate PT sessions and aid with self management                        Eval Status:  Initiated                        Current Status:  2.  Pt to tolerate 30 min or more of TE and/or Interventions w/o increased s/s                        Eval Status:  Initiated                        Current Status: Met at 35 min 2/9/18      Long Term Goals: To be accomplished in 10 treatments:  Progress towards goals / Updated goals:  1.  Pt will report 50% improvement or better with involvement to show a significant increase in function                        Eval Status:  Initiated                        VGJWOFQ Status: Met at 50% 2/26/18  2.  Pt will Demonstrate good pelvic alignment and mobility to aid with decreased pain of the Lx spine                        Eval Status:  Initiated                        Current Status: Progressing with good (B) Innominate mobility today 3/26/18  3.  Pt will have improved (B) HS length to 160* or better to allow good pelvic mobility and decreased pain to 1/10 or better to tolerate leaning over a sink to brush teeth w/o added pain                          Eval Status: 150 (B)                        Current Status: No change 3/22/18  4.  Pt will have good (R) multifidus initiation to aid in Lx stability with prolonged standing and leaning forward to brush teeth and perform usual work                          Eval Status:  Initiated                        Current Status: Improved initiation 4/6//18  5.  Pt will improve FOTO score to 71 in 10 visits to show significant improvement in function for progress to little to no numbness/Neural tension of the (R) foot                        Eval Status: 65                        Current Status: Declined to 63 2/26/18  PLAN  [x]  Upgrade activities as tolerated     [x]  Continue plan of care  []  Update interventions per flow sheet       []  Discharge due to:_  []  Other:_      Harshad Khan PT 4/6/2018  6:42 PM    Future Appointments  Date Time Provider Cielo Bailey   4/16/2018 5:00 PM Harshad Khan PT [facility]   4/25/2018 5:00 PM Harshad Khan PT [facility]   10/9/2018 8:30 AM [place] [address] [facility]   10/16/2018 3:30 PM Antonette Orr MD [address]

## 2018-04-16 ENCOUNTER — HOSPITAL ENCOUNTER (OUTPATIENT)
Dept: PHYSICAL THERAPY | Age: 58
Discharge: HOME | End: 2018-04-16
Payer: COMMERCIAL

## 2018-04-16 PROCEDURE — 97012 MECHANICAL TRACTION THERAPY: CPT

## 2018-04-16 PROCEDURE — 97112 NEUROMUSCULAR REEDUCATION: CPT

## 2018-04-16 PROCEDURE — 97110 THERAPEUTIC EXERCISES: CPT

## 2018-04-16 PROCEDURE — 97032 APPL MODALITY 1+ESTIM EA 15: CPT

## 2018-04-16 NOTE — PROGRESS NOTES
PT DAILY TREATMENT NOTE 3    Patient Name: Susan Fitch MD  Date:2018  : 1960  [x]  Patient  Verified  Payor: Dominguez Bhakta / Plan: [address] [place] [address] PT / Product Type: Commerical /    In time:5:00 Out time: 6:33  Total Treatment Time (min): 56  Visit #: 3 of 10    Treatment Area: Low back pain [M54.5]  SUBJECTIVE  Pain Level (0-10 scale): 3  Any medication changes, allergies to medications, adverse drug reactions, diagnosis change, or new procedure performed?: [x] No    [] Yes (see summary sheet for update)  Subjective functional status/changes:   [] No changes reported  No significant change with traction noted     OBJECTIVE  Modality rationale: decrease inflammation, decrease pain and increase tissue extensibility to improve the patients ability to perform increased ADL   Min Type Additional Details    [] Estim:  []Unatt       []IFC  []Premod                        []Other:  []w/ice   []w/heat  Position:  Location:   10 [x] Estim: [x]Att    []TENS instruct  []NMES                    [x]Other: LTO []w/US   []w/ice   []w/heat  Position: Propne  Location: Right Lx paraspinals/Quadratus Lumborum   20 [x]  Traction: [] Cervical       [x]Lumbar                       [] Prone          [x]Supine                       []Intermittent   []Continuous Lbs: 72 Max  36 Min  [] before manual  [] after manual    []  Ultrasound: []Continuous   [] Pulsed                           []1MHz   []3MHz Location:  W/cm2:    []  Iontophoresis with dexamethasone         Location: [] Take home patch   [] In clinic    []  Ice     []  heat  []  Ice massage  []  Laser   []  Anodyne Position: Prone  Location:Lx    []  Laser with stim  []  Other: Position:  Location:    []  Vasopneumatic Device Pressure:       [] lo [] med [] hi   Temperature: [] lo [] med [] hi   [x] Skin assessment post-treatment:  [x]intact []redness- no adverse reaction    []redness  adverse reaction:     12 min Therapeutic Exercise:  [x] See flow sheet : Rationale: increase ROM, increase strength and improve coordination to improve the patients ability to perform increased ADL    20 min Neuromuscular Re-education:  [x]  See flow sheet :   Rationale: increase ROM, increase strength, improve coordination and Inhibit tissue tightness  to improve the patients ability to perform increased ADL          With   [x] TE   [] TA   [] neuro   [] other: Patient Education: [x] Review HEP    [] Progressed/Changed HEP based on:   [] positioning   [] body mechanics   [] transfers   [] heat/ice application    [] other:      Other Objective/Functional Measures:   - Good response to treatment program of traction and LTO     Pain Level (0-10 scale) post treatment: 1-2    ASSESSMENT/Changes in Function:      Patient will continue to benefit from skilled PT services to modify and progress therapeutic interventions, address functional mobility deficits, address ROM deficits, address strength deficits, analyze and address soft tissue restrictions, analyze and cue movement patterns and analyze and modify body mechanics/ergonomics to attain remaining goals.      []  See Plan of Care  []  See progress note/recertification  []  See Discharge Summary         Progress towards goals / Updated goals:  Short Term Goals: To be accomplished in 5 treatments:  1.  Pt will be compliant and independent with HEP in order to facilitate PT sessions and aid with self management                        Eval Status:  Initiated                        Current Status:  2.  Pt to tolerate 30 min or more of TE and/or Interventions w/o increased s/s                        Eval Status:  Initiated                        Current Status: Met at 35 min 2/9/18      Long Term Goals: To be accomplished in 10 treatments:  Progress towards goals / Updated goals:  1.  Pt will report 50% improvement or better with involvement to show a significant increase in function                        Eval Status:  Initiated                        CYPFVXF Status: Met at 50% 2/26/18  2.  Pt will Demonstrate good pelvic alignment and mobility to aid with decreased pain of the Lx spine                        Eval Status:  Initiated                        NUHIWSF Status: Progressing with good (B) Innominate mobility today 3/26/18  3.  Pt will have improved (B) HS length to 160* or better to allow good pelvic mobility and decreased pain to 1/10 or better to tolerate leaning over a sink to brush teeth w/o added pain                          Eval Status: 150 (B)                        Current Status: No change 3/22/18  4.  Pt will have good (R) multifidus initiation to aid in Lx stability with prolonged standing and leaning forward to brush teeth and perform usual work                          Eval Status:  Initiated                        Current Status: Improved initiation 4/6//18  5.  Pt will improve FOTO score to 71 in 10 visits to show significant improvement in function for progress to little to no numbness/Neural tension of the (R) foot                        Eval Status: 65                        Current Status: Improved to 70 4/16/18  PLAN  [x]  Upgrade activities as tolerated     [x]  Continue plan of care  []  Update interventions per flow sheet       []  Discharge due to:_  []  Other:_      Kyleigh Manuel PT 4/16/2018  6:28 PM    Future Appointments  Date Time Provider Cielo Bailey   4/25/2018 5:00 PM Kyleigh Manuel PT [facility]   10/9/2018 8:30 AM [place] [address] [facility]   10/16/2018 3:30 PM Sofía Lerner MD [address]

## 2018-04-18 ENCOUNTER — HOSPITAL ENCOUNTER (OUTPATIENT)
Dept: PHYSICAL THERAPY | Age: 58
Discharge: HOME | End: 2018-04-18
Payer: COMMERCIAL

## 2018-04-18 PROCEDURE — 97110 THERAPEUTIC EXERCISES: CPT

## 2018-04-18 PROCEDURE — 97012 MECHANICAL TRACTION THERAPY: CPT

## 2018-04-18 NOTE — PROGRESS NOTES
PT DAILY TREATMENT NOTE 3-16    Patient Name: Doris Gomes MD  Date:2018  : 1960  [x]  Patient  Verified  Payor: Blas Mulling / Plan: [employer]  CAPITATED PT / Product Type: Commerical /    In time:4:28 Out time: 5:16  Total Treatment Time (min):38  Visit #: 4 of 10    Treatment Area: Low back pain [M54.5]  SUBJECTIVE  Pain Level (0-10 scale): 3  Any medication changes, allergies to medications, adverse drug reactions, diagnosis change, or new procedure performed?: [x] No    [] Yes (see summary sheet for update)  Subjective functional status/changes:   [] No changes reported  No numbness in the leg.   Traction seems to be helping    OBJECTIVE  Modality rationale: decrease inflammation, decrease pain and increase tissue extensibility to improve the patients ability to perform increased ADL   Min Type Additional Details    [] Estim:  []Unatt       []IFC  []Premod                        []Other:  []w/ice   []w/heat  Position:  Location:    [] Estim: []Att    []TENS instruct  []NMES                    [x]Other: LTO []w/US   []w/ice   []w/heat  Position:   Location:     20 [x]  Traction: [] Cervical       [x]Lumbar                       [] Prone          [x]Supine                       []Intermittent   []Continuous Lbs: 65 Max  36 Min  [] before manual  [] after manual    []  Ultrasound: []Continuous   [] Pulsed                           []1MHz   []3MHz Location:  W/cm2:    []  Iontophoresis with dexamethasone         Location: [] Take home patch   [] In clinic    []  Ice     []  heat  []  Ice massage  []  Laser   []  Anodyne Position: Prone  Location:Lx    []  Laser with stim  []  Other: Position:  Location:    []  Vasopneumatic Device Pressure:       [] lo [] med [] hi   Temperature: [] lo [] med [] hi   [x] Skin assessment post-treatment:  [x]intact []redness- no adverse reaction    []redness  adverse reaction:     18 min Therapeutic Exercise:  [x] See flow sheet :   Rationale: increase ROM, increase strength and improve coordination to improve the patients ability to perform increased ADL     With   [x] TE   [] TA   [] neuro   [] other: Patient Education: [x] Review HEP    [] Progressed/Changed HEP based on:   [] positioning   [] body mechanics   [] transfers   [] heat/ice application    [] other:      Other Objective/Functional Measures:   - Good response to traction     Pain Level (0-10 scale) post treatment: 1-2    ASSESSMENT/Changes in Function:      Patient will continue to benefit from skilled PT services to modify and progress therapeutic interventions, address functional mobility deficits, address ROM deficits, address strength deficits, analyze and address soft tissue restrictions, analyze and cue movement patterns and analyze and modify body mechanics/ergonomics to attain remaining goals.      []  See Plan of Care  []  See progress note/recertification  []  See Discharge Summary         Progress towards goals / Updated goals:  Short Term Goals: To be accomplished in 5 treatments:  1.  Pt will be compliant and independent with HEP in order to facilitate PT sessions and aid with self management                        Eval Status:  Initiated                        Current Status:  2.  Pt to tolerate 30 min or more of TE and/or Interventions w/o increased s/s                        Eval Status:  Initiated                        Current Status: Met at 35 min 2/9/18      Long Term Goals: To be accomplished in 10 treatments:  Progress towards goals / Updated goals:  1.  Pt will report 50% improvement or better with involvement to show a significant increase in function                        Eval Status:  Initiated                        Current Status: Met at 50% 2/26/18  2.  Pt will Demonstrate good pelvic alignment and mobility to aid with decreased pain of the Lx spine                        Eval Status:  Initiated                        Current Status: Progressing with good (B) Innominate mobility today 3/26/18  3.  Pt will have improved (B) HS length to 160* or better to allow good pelvic mobility and decreased pain to 1/10 or better to tolerate leaning over a sink to brush teeth w/o added pain                          Eval Status: 150 (B)                        Current Status: No change 3/22/18  4.  Pt will have good (R) multifidus initiation to aid in Lx stability with prolonged standing and leaning forward to brush teeth and perform usual work                          Eval Status:  Initiated                        Current Status: Improved initiation 4/6//18  5.  Pt will improve FOTO score to 71 in 10 visits to show significant improvement in function for progress to little to no numbness/Neural tension of the (R) foot                        Eval Status: 65                        Current Status: Improved to 70 4/16/18  PLAN  [x]  Upgrade activities as tolerated     [x]  Continue plan of care  []  Update interventions per flow sheet       []  Discharge due to:_  []  Other:_      Miguel Perkins PT 4/18/2018  7:02 PM    Future Appointments  Date Time Provider Cielo Bailey   4/25/2018 5:00 PM Miguel Perkins PT [facility]   10/9/2018 8:30 AM [facility]   10/16/2018 3:30 PM Vanna Gotti MD [id number]

## 2018-04-25 ENCOUNTER — APPOINTMENT (OUTPATIENT)
Dept: PHYSICAL THERAPY | Age: 58
End: 2018-04-25
Payer: COMMERCIAL

## 2018-05-02 ENCOUNTER — HOSPITAL ENCOUNTER (OUTPATIENT)
Dept: PHYSICAL THERAPY | Age: 58
Discharge: HOME | End: 2018-05-02
Payer: COMMERCIAL

## 2018-05-02 PROCEDURE — 97110 THERAPEUTIC EXERCISES: CPT

## 2018-05-02 PROCEDURE — 97012 MECHANICAL TRACTION THERAPY: CPT

## 2018-05-02 NOTE — PROGRESS NOTES
PT DAILY TREATMENT NOTE 3-16    Patient Name: Andrei Rai MD  Date:2018  : 1960  [x]  Patient  Verified  Payor: Jesu Belle / [facility] / Product Type: Commerical /    In time:4:35 Out time: 5:25  Total Treatment Time (min):38  Visit #: 5 of 10    Treatment Area: Low back pain [M54.5]  SUBJECTIVE  Pain Level (0-10 scale): 3  Any medication changes, allergies to medications, adverse drug reactions, diagnosis change, or new procedure performed?: [x] No    [] Yes (see summary sheet for update)  Subjective functional status/changes:   [] No changes reported  Tingling is down    OBJECTIVE  Modality rationale: decrease inflammation, decrease pain and increase tissue extensibility to improve the patients ability to perform increased ADL   Min Type Additional Details    [] Estim:  []Unatt       []IFC  []Premod                        []Other:  []w/ice   []w/heat  Position:  Location:    [] Estim: []Att    []TENS instruct  []NMES                    [x]Other: LTO []w/US   []w/ice   []w/heat  Position:   Location:     20 [x]  Traction: [] Cervical       [x]Lumbar                       [] Prone          [x]Supine                       []Intermittent   []Continuous Lbs: 65 Max  36 Min  [] before manual  [] after manual    []  Ultrasound: []Continuous   [] Pulsed                           []1MHz   []3MHz Location:  W/cm2:    []  Iontophoresis with dexamethasone         Location: [] Take home patch   [] In clinic    []  Ice     []  heat  []  Ice massage  []  Laser   []  Anodyne Position: Prone  Location:Lx    []  Laser with stim  []  Other: Position:  Location:    []  Vasopneumatic Device Pressure:       [] lo [] med [] hi   Temperature: [] lo [] med [] hi   [x] Skin assessment post-treatment:  [x]intact []redness- no adverse reaction    []redness  adverse reaction:     10 min Therapeutic Exercise:  [x] See flow sheet :   Rationale: increase ROM, increase strength and improve coordination to improve the patients ability to perform increased ADL     With   [x] TE   [] TA   [] neuro   [] other: Patient Education: [x] Review HEP    [] Progressed/Changed HEP based on:   [] positioning   [] body mechanics   [] transfers   [] heat/ice application    [] other:      Other Objective/Functional Measures:   - Good response to traction with reports of decreased tingling     Pain Level (0-10 scale) post treatment: 2    ASSESSMENT/Changes in Function:      Patient will continue to benefit from skilled PT services to modify and progress therapeutic interventions, address functional mobility deficits, address ROM deficits, address strength deficits, analyze and address soft tissue restrictions, analyze and cue movement patterns and analyze and modify body mechanics/ergonomics to attain remaining goals.      []  See Plan of Care  []  See progress note/recertification  []  See Discharge Summary         Progress towards goals / Updated goals:  Short Term Goals: To be accomplished in 5 treatments:  1.  Pt will be compliant and independent with HEP in order to facilitate PT sessions and aid with self management                        Eval Status:  Initiated                        Current Status:  2.  Pt to tolerate 30 min or more of TE and/or Interventions w/o increased s/s                        Eval Status:  Initiated                        Current Status: Met at 35 min 2/9/18      Long Term Goals: To be accomplished in 10 treatments:  Progress towards goals / Updated goals:  1.  Pt will report 50% improvement or better with involvement to show a significant increase in function                        Eval Status:  Initiated                        Current Status: Met at 50% 2/26/18  2.  Pt will Demonstrate good pelvic alignment and mobility to aid with decreased pain of the Lx spine                        Eval Status:  Initiated                        Current Status: Progressing with good (B) Innominate mobility today 3/26/18  3.  Pt will have improved (B) HS length to 160* or better to allow good pelvic mobility and decreased pain to 1/10 or better to tolerate leaning over a sink to brush teeth w/o added pain                          Eval Status: 150 (B)                        Current Status: No change 3/22/18  4.  Pt will have good (R) multifidus initiation to aid in Lx stability with prolonged standing and leaning forward to brush teeth and perform usual work                          Eval Status:  Initiated                        Current Status: Good progress with reports of improved stability with yard work 5/2/18  5.  Pt will improve FOTO score to 71 in 10 visits to show significant improvement in function for progress to little to no numbness/Neural tension of the (R) foot                        Eval Status: 65                        Current Status: Improved to 70 4/16/18  PLAN  [x]  Upgrade activities as tolerated     [x]  Continue plan of care  []  Update interventions per flow sheet       []  Discharge due to:_  []  Other:_      Trina Valverde PT 5/2/2018  7:35 PM    Future Appointments  Date Time Provider Cielo Bailey   5/8/2018 4:30 PM Trina Valverde PT [facility]   5/11/2018 5:30 PM Trina Valverde PT [facility]   5/14/2018 5:00 PM Trina Valverde PT [facility]   10/9/2018 8:30 AM [facility]   10/16/2018 3:30 PM MD Elana Drummond

## 2018-05-06 NOTE — PROGRESS NOTES
Aden Panda Ksa[address]rego [address], [address] [place] [address], [address] [place], [place] [address] [address] [address]  [phone] fax    Progress Note  Patient name: Saira Pena MD Start of Care: 1/3/2018   Referral source: Pamela Lee MD : 1960                          Medical Diagnosis: Low back pain [M54.5] Onset Date:3 Months ago                          Treatment Diagnosis: Pelvic obliquity, Neural tension (R) LE   Prior Hospitalization: see medical history Provider#: [id number]   Medications: Verified on Patient summary List    Comorbidities: None noted   Prior Level of Function: (I) with normal activity with some back pain    Visits from Start of Care: 12    Missed Visits: 0    Established Goals:         Excellent           Good         Limited           None  [] Increased ROM   []  [x]  []  []  [] Increased Strength  []  [x]  []  []  [] Increased Mobility  []  [x]  []  []   [] Decreased Pain   []  [x]  []  []  [] Decreased Swelling  []  []  []  []    Key Functional Changes: Patient has shown good progress with this treatment program. Pain as of last visit was slightly increased to 3/10 on the non involved side. Patient has shown decreased pain and increased strength, mobility, flexibility and tolerance to increased activity levels. He reports decreased radicular symptoms into the right LE indicating decreased tingling into the right foot. See other objective measures below for additional details. Patient appears happy with results to this point and would like to continue with the current POC for continued progress. All STG/LTGs achieved as identified below.       Fall Risk Assessment: Patient demonstrates no Fall Risk      Other Objective/Functional Measures:  - Decreased numbness and tingling and improved activity tolerance    Updated Goals: to be achieved in 3 visits:  1.  Pt will report 50% improvement or better with involvement to show a significant increase in function                        PN Status: Met at 70-75% for Lx pain and progressing at 30-40% for LE numbness                         Current Status:   2.  Pt will Demonstrate good pelvic alignment and mobility to aid with decreased pain of the Lx spine                        PN Status: Progressing with good (B) Innominate mobility today                         Current Status:   3.  Pt will have improved (B) HS length to 160* or better to allow good pelvic mobility and decreased pain to 1/10 or better to tolerate leaning over a sink to brush teeth w/o added pain                          PN Status: Progressing at Right 153 and Left 157                        Current Status:   4.  Pt will have good (R) multifidus initiation to aid in Lx stability with prolonged standing and leaning forward to brush teeth and perform usual work                          PN Status:  Good (B) Initiation                        Current Status:     5.  Pt will improve FOTO score to 71 in 10 visits to show significant improvement in function for progress to little to no numbness/Neural tension of the (R) foot                        PN Status: 70                         Current Status:     ASSESSMENT/RECOMMENDATIONS:  [x]Continue therapy per initial plan/protocol at a frequency of  1-2 x per week for 4 weeks  [x]Continue therapy with the following recommended changes:  Add Lifts from floor to waist and waist to overhead to assess function   []Discontinue therapy progressing towards or have reached established goals  []Discontinue therapy due to lack of appreciable progress towards goals  []Discontinue therapy due to lack of attendance or compliance  []Await Physician's recommendations/decisions regarding therapy  []Other:________________________________________________________________    Thank you for this referral.   Radha Lima, PT 5/6/2018 10:36 AM  NOTE TO PHYSICIAN:  PLEASE COMPLETE THE ORDERS BELOW AND   FAX TO [facility] Therapy: [phone]  If you are unable to process this request in 24 hours please contact our office: [phone]    []  I have read the above report and request that my patient continue as recommended. []  I have read the above report and request that my patient continue therapy with the following changes/special instructions:________________________________________  []I have read the above report and request that my patient be discharged from therapy.     Physicians signature: ______________________________Date: _____Time:______

## 2018-05-08 ENCOUNTER — HOSPITAL ENCOUNTER (OUTPATIENT)
Dept: PHYSICAL THERAPY | Age: 58
Discharge: HOME | End: 2018-05-08
Payer: COMMERCIAL

## 2018-05-08 PROCEDURE — 97012 MECHANICAL TRACTION THERAPY: CPT

## 2018-05-08 PROCEDURE — 97110 THERAPEUTIC EXERCISES: CPT

## 2018-05-08 NOTE — PROGRESS NOTES
PT DAILY TREATMENT NOTE 3-16    Patient Name: Ti Rene MD  Date:2018  : 1960  [x]  Patient  Verified  Payor: Sylvia Ruth / Plan: [facility] / Product Type: Commerical /    In time:4:37  Out time:535  Total Treatment Time (min): 58  Visit #: 1 of 8    Treatment Area: Low back pain [M54.5]    SUBJECTIVE  Pain Level (0-10 scale): 3-4  Any medication changes, allergies to medications, adverse drug reactions, diagnosis change, or new procedure performed?: [x] No    [] Yes (see summary sheet for update)  Subjective functional status/changes:   [] No changes reported  Been doing a lot of work. Both sides of the back are hurting.   Numbness and tingling have been intermittent    OBJECTIVE  Modality rationale: decrease inflammation, decrease pain and increase tissue extensibility to improve the patients ability to perform increased ADL   Min Type Additional Details    [] Estim:  []Unatt       []IFC  []Premod                        []Other:  []w/ice   []w/heat  Position:  Location:    [] Estim: []Att    []TENS instruct  []NMES                    []Other:  []w/US   []w/ice   []w/heat  Position:  Location:   25 [x]  Traction: [] Cervical       [x]Lumbar                       [] Prone          []Supine                       []Intermittent   []Continuous Lbs: 75 Max  40 min  [] before manual  [] after manual    []  Ultrasound: []Continuous   [] Pulsed                           []1MHz   []3MHz Location:  W/cm2:    []  Iontophoresis with dexamethasone         Location: [] Take home patch   [] In clinic    []  Ice     []  heat  []  Ice massage  []  Laser   []  Anodyne Position:  Location:    []  Laser with stim  []  Other: Position:  Location:    []  Vasopneumatic Device Pressure:       [] lo [] med [] hi   Temperature: [] lo [] med [] hi   [x] Skin assessment post-treatment:  [x]intact []redness- no adverse reaction    []redness  adverse reaction:     33 min Therapeutic Exercise:  [x] See flow sheet : Rationale: increase ROM, increase strength and improve coordination to improve the patients ability to perform increased ADL       With   [x] TE   [] TA   [] neuro   [] other: Patient Education: [x] Review HEP    [] Progressed/Changed HEP based on:   [] positioning   [] body mechanics   [] transfers   [] heat/ice application    [] other:      Other Objective/Functional Measures:   - HS 90/90 Right 145 Left 143  - Decr mobility (B) innominate in stork stance  - Elev Right crest  - Elev Right PSIS  - Good pelvic alignment and good mobility after performing alternating high knees and toe touches     Pain Level (0-10 scale) post treatment: 1-2    ASSESSMENT/Changes in Function:      Patient will continue to benefit from skilled PT services to modify and progress therapeutic interventions, address functional mobility deficits, address ROM deficits, address strength deficits, analyze and address soft tissue restrictions and analyze and cue movement patterns to attain remaining goals.      []  See Plan of Care  []  See progress note/recertification  []  See Discharge Summary         Progress towards goals / Updated goals:  Updated Goals: to be achieved in 3 visits:  1.  Pt will report 50% improvement or better with involvement to show a significant increase in function                        PN Status: Met at 70-75% for Lx pain and progressing at 30-40% for LE numbness                         Current Status:   2.  Pt will Demonstrate good pelvic alignment and mobility to aid with decreased pain of the Lx spine                        PN Status: Progressing with good (B) Innominate mobility today                         Current Status: Elev Right crest and PSIS, decr mobility (B) Innominate in stork stance 5/8/18   3.  Pt will have improved (B) HS length to 160* or better to allow good pelvic mobility and decreased pain to 1/10 or better to tolerate leaning over a sink to brush teeth w/o added pain                          PN Status: Progressing at Right 153 and Left 157                        Current Status: Decreased HS length Right 145 Left 143 5/8/18  4.  Pt will have good (R) multifidus initiation to aid in Lx stability with prolonged standing and leaning forward to brush teeth and perform usual work                          PN Status:  Good (B) Initiation                        Current Status:     5.  Pt will improve FOTO score to 71 in 10 visits to show significant improvement in function for progress to little to no numbness/Neural tension of the (R) foot                        PN Status: 70                         Current Status:     PLAN  [x]  Upgrade activities as tolerated     [x]  Continue plan of care  []  Update interventions per flow sheet       []  Discharge due to:_  []  Other:_      Jovanna Chase PT 5/8/2018  4:41 PM    Future Appointments  Date Time Provider Cielo Bailey   5/11/2018 5:30 PM Jovanna Chase PT [facility]   5/14/2018 5:00 PM [facility] Chase [facility]   10/9/2018 8:30 AM [facility] [address] [place] [address]   10/16/2018 3:30 PM Hector Cabrera MD [id number] [facility]

## 2018-05-11 ENCOUNTER — HOSPITAL ENCOUNTER (OUTPATIENT)
Dept: PHYSICAL THERAPY | Age: 58
Discharge: HOME | End: 2018-05-11
Payer: COMMERCIAL

## 2018-05-11 PROCEDURE — 97012 MECHANICAL TRACTION THERAPY: CPT

## 2018-05-11 PROCEDURE — 97110 THERAPEUTIC EXERCISES: CPT

## 2018-05-11 NOTE — PROGRESS NOTES
PT DAILY TREATMENT NOTE 3-16    Patient Name: Harriet Jalloh MD  Date:2018  : 1960  [x]  Patient  Verified  Payor: Jamal Paula / Plan: [facility] / Product Type: Commerical /    In time:5:42  Out time:6:34  Total Treatment Time (min): 50  Visit #: 2 of 8    Treatment Area: Low back pain [M54.5]    SUBJECTIVE  Pain Level (0-10 scale): 2  Any medication changes, allergies to medications, adverse drug reactions, diagnosis change, or new procedure performed?: [x] No    [] Yes (see summary sheet for update)  Subjective functional status/changes:   [] No changes reported  Felt better over the last few days.     OBJECTIVE  Modality rationale: decrease inflammation, decrease pain and increase tissue extensibility to improve the patients ability to perform increased ADL   Min Type Additional Details    [] Estim:  []Unatt       []IFC  []Premod                        []Other:  []w/ice   []w/heat  Position:  Location:    [] Estim: []Att    []TENS instruct  []NMES                    []Other:  []w/US   []w/ice   []w/heat  Position:  Location:   22 [x]  Traction: [] Cervical       [x]Lumbar                       [] Prone          []Supine                       []Intermittent   []Continuous Lbs: 75 Max  36 Min  [] before manual  [] after manual    []  Ultrasound: []Continuous   [] Pulsed                           []1MHz   []3MHz Location:  W/cm2:    []  Iontophoresis with dexamethasone         Location: [] Take home patch   [] In clinic    []  Ice     []  heat  []  Ice massage  []  Laser   []  Anodyne Position:  Location:    []  Laser with stim  []  Other: Position:  Location:    []  Vasopneumatic Device Pressure:       [] lo [] med [] hi   Temperature: [] lo [] med [] hi   [x] Skin assessment post-treatment:  [x]intact []redness- no adverse reaction    []redness  adverse reaction:     28 min Therapeutic Exercise:  [x] See flow sheet :   Rationale: increase ROM, increase strength and improve coordination to improve the patients ability to perform increased ADL     With   [x] TE   [] TA   [] neuro   [] other: Patient Education: [x] Review HEP    [] Progressed/Changed HEP based on:   [] positioning   [] body mechanics   [] transfers   [] heat/ice application    [] other:      Other Objective/Functional Measures:   - Good response to mechanical traction  - Good participation for stretches today  - HS 90/90 Right 147  Left 157      Pain Level (0-10 scale) post treatment: 1    ASSESSMENT/Changes in Function:      Patient will continue to benefit from skilled PT services to modify and progress therapeutic interventions, address functional mobility deficits, address ROM deficits, address strength deficits, analyze and address soft tissue restrictions and analyze and cue movement patterns to attain remaining goals.      []  See Plan of Care  []  See progress note/recertification  []  See Discharge Summary         Progress towards goals / Updated goals:  Updated Goals: to be achieved in 3 visits:  1.  Pt will report 50% improvement or better with involvement to show a significant increase in function                        PN Status: Met at 70-75% for Lx pain and progressing at 30-40% for LE numbness                         Current Status:   2.  Pt will Demonstrate good pelvic alignment and mobility to aid with decreased pain of the Lx spine                        PN Status: Progressing with good (B) Innominate mobility today                         Current Status: Elev Right crest and PSIS, decr mobility (B) Innominate in stork stance 5/8/18   3.  Pt will have improved (B) HS length to 160* or better to allow good pelvic mobility and decreased pain to 1/10 or better to tolerate leaning over a sink to brush teeth w/o added pain                          PN Status: Progressing at Right 153 and Left 157                        Current Status: Improved since last visit Right 147 Left 157 5/11/18  4.  Pt will have good (R) multifidus initiation to aid in Lx stability with prolonged standing and leaning forward to brush teeth and perform usual work                          PN Status:  Good (B) Initiation                        Current Status:     5.  Pt will improve FOTO score to 71 in 10 visits to show significant improvement in function for progress to little to no numbness/Neural tension of the (R) foot                        PN Status: 70                         Current Status:     PLAN  [x]  Upgrade activities as tolerated     [x]  Continue plan of care  []  Update interventions per flow sheet       []  Discharge due to:_  []  Other:_      Emely Barraza PT 5/11/2018  6:24 PM    Future Appointments  Date Time Provider Cielo Bailey   5/14/2018 5:00 PM Emely Barraza PT [facility]   10/9/2018 8:30 AM [address]   10/16/2018 3:30 PM Cherie Chairez MD [id number] [facility]

## 2018-05-11 NOTE — PROGRESS NOTES
Aden Panda Ksa[address], [address] [place] [address], [address] [place], [place] [address],[place] [address]  [phone] fax    Progress Note  Patient name: Shruthi Dozier MD Start of Care: 1/3/2018   Referral source: Eleuterio Reyna MD : 1960                          Medical Diagnosis: Low back pain [M54.5] Onset Date:3 Months ago                          Treatment Diagnosis: Pelvic obliquity, Neural tension (R) LE   Prior Hospitalization: see medical history Provider#: [id number]   Medications: Verified on Patient summary List    Comorbidities: None noted   Prior Level of Function: (I) with normal activity with some back pain    Visits from Start of Care: 20    Missed Visits: 0    Established Goals:         Excellent           Good         Limited           None  [] Increased ROM   []  [x]  []  []  [] Increased Strength  []  [x]  []  []  [] Increased Mobility  []  [x]  []  []   [] Decreased Pain   []  [x]  []  []  [] Decreased Swelling  [x]  []  []  []    Key Functional Changes: Patient has shown good progress with this treatment program. Pain as of last visit was 2/10. Patient has shown decreased pain and increased strength, mobility, and tolerance to increased activity levels. Pt reports improved results since starting mechanical traction of the lumbar spine on 18. Pt has received 6 treatments to this point with a good response. He reports decreased radicular symptoms into the right LE indicating decreased tingling into the right foot. See other objective measures below for additional details. Patient appears happy with results to this point and would like to continue with the current POC to include Lumbar mechanical traction for continued progress. All STG/LTGs achieved as identified below.       Fall Risk Assessment: Patient demonstrates no Fall Risk      Other Objective/Functional Measures:  - Decreased numbness and tingling and improved activity tolerance  - Good response to mechanical traction  - Good participation for stretches today  - HS 90/90 Right 147  Left 157                       Updated Goals: to be achieved in 3 visits:  1.  Pt will report 50% improvement or better with involvement to show a significant increase in function                        PN Status: Met at 70-75% for Lx pain and progressing at 30-40% for LE numbness                         Current Status:   2.  Pt will Demonstrate good pelvic alignment and mobility to aid with decreased pain of the Lx spine                        PN Status: Progressing with good (B) Innominate mobility today                         Current Status:   3.  Pt will have improved (B) HS length to 160* or better to allow good pelvic mobility and decreased pain to 1/10 or better to tolerate leaning over a sink to brush teeth w/o added pain                          PN Status: Progressing at Right 153 and Left 157                        Current Status:   4.  Pt will have good (R) multifidus initiation to aid in Lx stability with prolonged standing and leaning forward to brush teeth and perform usual work                          PN Status:  Good (B) Initiation                        Current Status:     5.  Pt will improve FOTO score to 71 in 10 visits to show significant improvement in function for progress to little to no numbness/Neural tension of the (R) foot                        PN Status: 70                         Current Status:     ASSESSMENT/RECOMMENDATIONS:  [x]Continue therapy per last progress note with 6 visits remaining  [x]Continue therapy with the following recommended changes:  Add Lifts from floor to waist and waist to overhead to assess function   []Discontinue therapy progressing towards or have reached established goals  []Discontinue therapy due to lack of appreciable progress towards goals  []Discontinue therapy due to lack of attendance or compliance  []Await Physician's recommendations/decisions regarding therapy  []Other:________________________________________________________________    Thank you for this referral.   Marjorie Bonilla, PT 5/11/2018 6:19 PM  NOTE TO PHYSICIAN:  PLEASE COMPLETE THE ORDERS BELOW AND   FAX TO [facility] [facility]: [phone]  If you are unable to process this request in 24 hours please contact our office: [phone]    []  I have read the above report and request that my patient continue as recommended. []  I have read the above report and request that my patient continue therapy with the following changes/special instructions:________________________________________  []I have read the above report and request that my patient be discharged from therapy.     Physicians signature: ______________________________Date: _____Time:______

## 2018-05-14 ENCOUNTER — HOSPITAL ENCOUNTER (OUTPATIENT)
Dept: PHYSICAL THERAPY | Age: 58
Discharge: HOME | End: 2018-05-14
Payer: COMMERCIAL

## 2018-05-14 PROCEDURE — 97032 APPL MODALITY 1+ESTIM EA 15: CPT

## 2018-05-14 PROCEDURE — 97012 MECHANICAL TRACTION THERAPY: CPT

## 2018-05-14 PROCEDURE — 97110 THERAPEUTIC EXERCISES: CPT

## 2018-05-14 PROCEDURE — 97140 MANUAL THERAPY 1/> REGIONS: CPT

## 2018-05-16 ENCOUNTER — APPOINTMENT (OUTPATIENT)
Dept: PHYSICAL THERAPY | Age: 58
End: 2018-05-16
Payer: COMMERCIAL

## 2018-05-21 ENCOUNTER — APPOINTMENT (OUTPATIENT)
Dept: PHYSICAL THERAPY | Age: 58
End: 2018-05-21
Payer: COMMERCIAL

## 2018-05-30 ENCOUNTER — HOSPITAL ENCOUNTER (OUTPATIENT)
Dept: PHYSICAL THERAPY | Age: 58
Discharge: HOME | End: 2018-05-30
Payer: COMMERCIAL

## 2018-05-30 PROCEDURE — 97012 MECHANICAL TRACTION THERAPY: CPT

## 2018-05-30 PROCEDURE — 97110 THERAPEUTIC EXERCISES: CPT

## 2018-05-30 PROCEDURE — 97032 APPL MODALITY 1+ESTIM EA 15: CPT

## 2018-05-30 NOTE — PROGRESS NOTES
PT DAILY TREATMENT NOTE 3-16    Patient Name: Saira Pena MD  Date:2018  : 1960  [x]  Patient  Verified  Payor: Fabby Ta / Plan: [facility] [employer]  [facility] / Product Type: Commerical /    In time:4:45  Out time: 5:40  Total Treatment Time (min): 46  Visit #: 4 of 8    Treatment Area: Low back pain [M54.5]    SUBJECTIVE  Pain Level (0-10 scale): 1  Any medication changes, allergies to medications, adverse drug reactions, diagnosis change, or new procedure performed?: [x] No    [] Yes (see summary sheet for update)  Subjective functional status/changes:   [] No changes reported  Doing good, less numbness in the foot.     OBJECTIVE  Modality rationale: decrease inflammation, decrease pain and increase tissue extensibility to improve the patients ability to perform increased ADL   Min Type Additional Details    [] Estim:  []Unatt       []IFC  []Premod                        []Other:  []w/ice   []w/heat  Position:  Location:   8 [x] Estim: [x]Att    []TENS instruct  []NMES                    [x]Other: LTO []w/US   []w/ice   []w/heat  Position: Prone  Location: Right Lx paraspinals    22 [x]  Traction: [] Cervical       [x]Lumbar                       [] Prone          []Supine                       []Intermittent   []Continuous Lbs: 80 Max 36 Min  [] before manual  [] after manual    []  Ultrasound: []Continuous   [] Pulsed                           []1MHz   []3MHz Location:  W/cm2:    []  Iontophoresis with dexamethasone         Location: [] Take home patch   [] In clinic    []  Ice     []  heat  []  Ice massage  []  Laser   []  Anodyne Position:  Location:    []  Laser with stim  []  Other: Position:  Location:    []  Vasopneumatic Device Pressure:       [] lo [] med [] hi   Temperature: [] lo [] med [] hi   [x] Skin assessment post-treatment:  [x]intact []redness- no adverse reaction    []redness  adverse reaction:   16 min Therapeutic Exercise:  [x] See flow sheet :   Rationale: increase ROM, increase strength and improve coordination to improve the patients ability to perform increased ADL  . With   [x] TE   [] TA   [] neuro   [] other: Patient Education: [x] Review HEP    [] Progressed/Changed HEP based on:   [] positioning   [] body mechanics   [] transfers   [] heat/ice application    [] other:      Other Objective/Functional Measures:   - HS 90/90 Right 155 Left 152  - Minor TTP Right Lx Paraspinals  - - Treat trigger point with Combo E-Stim with Low Level Laser (LTO)  - Good response to LTO with no pain with palpation     Pain Level (0-10 scale) post treatment: 0    ASSESSMENT/Changes in Function:      Patient will continue to benefit from skilled PT services to modify and progress therapeutic interventions, address functional mobility deficits, address ROM deficits, address strength deficits, analyze and address soft tissue restrictions and analyze and cue movement patterns to attain remaining goals.      []  See Plan of Care  []  See progress note/recertification  []  See Discharge Summary         Progress towards goals / Updated goals:  Updated Goals: to be achieved in 3 visits:  1.  Pt will report 50% improvement or better with involvement to show a significant increase in function                        PN Status: Met at 70-75% for Lx pain and progressing at 30-40% for LE numbness                         Current Status:   2.  Pt will Demonstrate good pelvic alignment and mobility to aid with decreased pain of the Lx spine                        PN Status: Progressing with good (B) Innominate mobility today                         Current Status: Elev Right crest and PSIS, decr mobility (B) Innominate in stork stance 5/8/18   3.  Pt will have improved (B) HS length to 160* or better to allow good pelvic mobility and decreased pain to 1/10 or better to tolerate leaning over a sink to brush teeth w/o added pain                          PN Status: Progressing at Right 153 and Left 157                        Current Status: Improved since last visit Right 157 Left 152 5/30/18  4.  Pt will have good (R) multifidus initiation to aid in Lx stability with prolonged standing and leaning forward to brush teeth and perform usual work                          PN Status:  Good (B) Initiation                        Current Status: Good multifidus initiation.  Back no longer interfering with usual work 5/30/18    5.  Pt will improve FOTO score to 71 in 10 visits to show significant improvement in function for progress to little to no numbness/Neural tension of the (R) foot                        PN Status: 70                         Current Status:   PLAN  [x]  Upgrade activities as tolerated     [x]  Continue plan of care  []  Update interventions per flow sheet       []  Discharge due to:_  []  Other:_      Trina Valverde, PT 5/30/2018  4:50 PM    Future Appointments  Date Time Provider Cielo Bailey   10/9/2018 8:30 AM [facility]   10/16/2018 3:30 PM Lyndon Abbott MD [facility]

## 2018-06-01 ENCOUNTER — APPOINTMENT (OUTPATIENT)
Dept: PHYSICAL THERAPY | Age: 58
End: 2018-06-01

## 2018-09-22 NOTE — PROGRESS NOTES
[address], [address] [place], [address] [place] [address],[facility] 300 [phone] fax Discharge Summary Patient name: Jah Rayo MD Start of Care: 1/3/2018 Referral source: Royal Izaguirre MD : 1960                         
Medical Diagnosis: Low back pain [M54.5] Onset Date:3 Months ago                         
Treatment Diagnosis: Pelvic obliquity, Neural tension (R) LE  
Prior Hospitalization: see medical history Provider#: [id number] Medications: Verified on Patient summary List  
 Comorbidities: None noted 
 Prior Level of Function: (I) with normal activity with some back pain Visits from Start of Care: 23    Missed Visits:   
Reporting Period : 1/3/18 to 18 Summary of Care: Patient has shown good progress with this treatment program. Pain as of last visit was 1/10. Patient has shown decreased pain and increased strength and mobility. Decreased neural symptoms into the LE and foot. Increased tolerance for usual activity to include prolonged standing with usual work tasks. See other objective measures below for additional details. Patient reports 70-75% improvement with overall involvement. FOTO score is 70. All STG/LTGs achieved as identified below. Fall Risk Assessment: Patient demonstrates no Fall Risk Other Objective/Functional Measures:  
- HS 90/90 Right 155 Left 152 - Minor TTP Right Lx Paraspinals 
- - Treat trigger point with Combo E-Stim with Low Level Laser (LTO) - Good response to LTO with no pain with palpation Progress towards goals / Updated goals: 
Updated Goals: to be achieved in 3 visits: 1.  Pt will report 50% improvement or better with involvement to show a significant increase in function                       XW Status: Met at 70-75% for Lx pain and progressing at 30-40% for LE numbness                       Current Status:  
 2.  Pt will Demonstrate good pelvic alignment and mobility to aid with decreased pain of the Lx spine 
                      PN Status: Progressing with good (B) Innominate mobility today  
                      Current Status: Elev Right crest and PSIS, decr mobility (B) Innominate in stork stance 5/8/18  
3.  Pt will have improved (B) HS length to 160* or better to allow good pelvic mobility and decreased pain to 1/10 or better to tolerate leaning over a sink to brush teeth w/o added pain   
                      PN Status: Progressing at Right 153 and Left 157 
                      Current Status: Improved since last visit Right 157 Left 152 5/30/18 4.  Pt will have good (R) multifidus initiation to aid in Lx stability with prolonged standing and leaning forward to brush teeth and perform usual work   
                      PN Status:  Good (B) Initiation 
                      Current Status: Good multifidus initiation. Back no longer interfering with usual work 5/30/18   
5.  Pt will improve FOTO score to 71 in 10 visits to show significant improvement in function for progress to little to no numbness/Neural tension of the (R) foot 
                      PN Status: 70  
                      Current Status:  
 
ASSESSMENT/RECOMMENDATIONS: 
[x]Discontinue therapy progressing towards or have reached established goals []Discontinue therapy due to lack of appreciable progress towards goals []Discontinue therapy due to lack of attendance or compliance []Other:  
 
Thank you for this referral.  
 
Aimee Love, PT 9/22/2018 3:52 PM

## 2019-11-12 PROBLEM — S92.254A CLOSED NONDISPLACED FRACTURE OF NAVICULAR BONE OF RIGHT FOOT: Status: ACTIVE | Noted: 2019-08-08

## 2019-11-12 PROBLEM — M06.4 INFLAMMATORY POLYARTHROPATHY (HCC): Status: ACTIVE | Noted: 2019-11-12

## 2019-11-12 PROBLEM — Z79.899 OTHER LONG TERM (CURRENT) DRUG THERAPY: Status: ACTIVE | Noted: 2019-11-12

## 2022-03-22 NOTE — PROGRESS NOTES
Care Transition Team Discharge Planning    Anticipated Discharge Disposition: when medically stable, assess for d/c needs    Action: Lsw attended rounds, and pt will stay here another day then tx to Tele unit. Pt is A/O x4. RN states later she understands [facility] team may d/c pt later this afternoon, no social needs.    Lsw spoke to [facility] in office. They indicate pt and family want to resume w/ HH, [facility].    [facility] will ask hospitalist to complete HH order.    Lsw noted order and faxed choice to [facility] indicating resume HH upon returning home.      Barriers to Discharge: none    Plan: pt to d/c home w/ HH resumption     PT DAILY TREATMENT NOTE 3-16    Patient Name: Joe Bates MD  Date:2018  : 1960  [x]  Patient  Verified  Payor: Woody Rather / Plan: [address] [place] [address] Rd PT / Product Type: Commerical /    In time:5:00  Out time:5:54  Total Treatment Time (min): 48  Visit #: 3 of 8    Treatment Area: Low back pain [M54.5]    SUBJECTIVE  Pain Level (0-10 scale): 2  Any medication changes, allergies to medications, adverse drug reactions, diagnosis change, or new procedure performed?: [x] No    [] Yes (see summary sheet for update)  Subjective functional status/changes:   [] No changes reported  Tingling is not gone but it is less than it was. It is hard to quantify it.     OBJECTIVE  Modality rationale: decrease inflammation, decrease pain and increase tissue extensibility to improve the patients ability to perform increased ADL   Min Type Additional Details    [] Estim:  []Unatt       []IFC  []Premod                        []Other:  []w/ice   []w/heat  Position:  Location:   14 [x] Estim: [x]Att    []TENS instruct  []NMES                    [x]Other: LTO []w/US   []w/ice   []w/heat  Position: Prone  Location: Right Lx paraspinals    22 [x]  Traction: [] Cervical       [x]Lumbar                       [] Prone          []Supine                       []Intermittent   []Continuous Lbs: 80 Max 36 Min  [] before manual  [x] after manual    []  Ultrasound: []Continuous   [] Pulsed                           []1MHz   []3MHz Location:  W/cm2:    []  Iontophoresis with dexamethasone         Location: [] Take home patch   [] In clinic    []  Ice     []  heat  []  Ice massage  []  Laser   []  Anodyne Position:  Location:    []  Laser with stim  []  Other: Position:  Location:    []  Vasopneumatic Device Pressure:       [] lo [] med [] hi   Temperature: [] lo [] med [] hi   [x] Skin assessment post-treatment:  [x]intact []redness- no adverse reaction    []redness  adverse reaction:   12 min Therapeutic Exercise:  [x] See flow sheet :   Rationale: increase ROM, increase strength and improve coordination to improve the patients ability to perform increased ADL  . With   [x] TE   [] TA   [] neuro   [] other: Patient Education: [x] Review HEP    [] Progressed/Changed HEP based on:   [] positioning   [] body mechanics   [] transfers   [] heat/ice application    [] other:      Other Objective/Functional Measures:   - TTP Right Lx Paraspinals with muscle tightness  - - Treat trigger point with Combo E-Stim with Low Level Laser (LTO)  - Good response to LTO with no pain with palpation     Pain Level (0-10 scale) post treatment: 0-1    ASSESSMENT/Changes in Function:      Patient will continue to benefit from skilled PT services to modify and progress therapeutic interventions, address functional mobility deficits, address ROM deficits, address strength deficits, analyze and address soft tissue restrictions and analyze and cue movement patterns to attain remaining goals.      []  See Plan of Care  []  See progress note/recertification  []  See Discharge Summary         Progress towards goals / Updated goals:  Updated Goals: to be achieved in 3 visits:  1.  Pt will report 50% improvement or better with involvement to show a significant increase in function                        PN Status: Met at 70-75% for Lx pain and progressing at 30-40% for LE numbness                         Current Status:   2.  Pt will Demonstrate good pelvic alignment and mobility to aid with decreased pain of the Lx spine                        PN Status: Progressing with good (B) Innominate mobility today                         Current Status: Elev Right crest and PSIS, decr mobility (B) Innominate in stork stance 5/8/18   3.  Pt will have improved (B) HS length to 160* or better to allow good pelvic mobility and decreased pain to 1/10 or better to tolerate leaning over a sink to brush teeth w/o added pain                          PN Status: Progressing at Right 153 and Left 157                        Current Status: Improved since last visit Right 147 Left 157 5/11/18  4.  Pt will have good (R) multifidus initiation to aid in Lx stability with prolonged standing and leaning forward to brush teeth and perform usual work                          PN Status:  Good (B) Initiation                        Current Status:     5.  Pt will improve FOTO score to 71 in 10 visits to show significant improvement in function for progress to little to no numbness/Neural tension of the (R) foot                        PN Status: 70                         Current Status:   PLAN  [x]  Upgrade activities as tolerated     [x]  Continue plan of care  []  Update interventions per flow sheet       []  Discharge due to:_  []  Other:_      Keven Burgess, PT 5/14/2018  4:58 PM    Future Appointments  Date Time Provider Cielo Bailey   5/14/2018 5:00 PM [facility]   10/9/2018 8:30 AM [facility] [id number]   10/16/2018 3:30 PM María Burciaga MD [address]